# Patient Record
Sex: MALE | Race: WHITE | NOT HISPANIC OR LATINO | Employment: OTHER | URBAN - METROPOLITAN AREA
[De-identification: names, ages, dates, MRNs, and addresses within clinical notes are randomized per-mention and may not be internally consistent; named-entity substitution may affect disease eponyms.]

---

## 2017-01-10 DIAGNOSIS — I10 ESSENTIAL HYPERTENSION: ICD-10-CM

## 2017-01-10 RX ORDER — AMLODIPINE BESYLATE 5 MG/1
5 TABLET ORAL
Qty: 90 TAB | Refills: 1 | Status: SHIPPED | OUTPATIENT
Start: 2017-01-10 | End: 2017-07-07 | Stop reason: SDUPTHER

## 2017-01-18 ENCOUNTER — OFFICE VISIT (OUTPATIENT)
Dept: MEDICAL GROUP | Facility: MEDICAL CENTER | Age: 82
End: 2017-01-18
Payer: COMMERCIAL

## 2017-01-18 ENCOUNTER — HOSPITAL ENCOUNTER (OUTPATIENT)
Dept: RADIOLOGY | Facility: MEDICAL CENTER | Age: 82
End: 2017-01-18
Attending: FAMILY MEDICINE
Payer: COMMERCIAL

## 2017-01-18 VITALS
RESPIRATION RATE: 16 BRPM | OXYGEN SATURATION: 94 % | WEIGHT: 214 LBS | HEIGHT: 69 IN | DIASTOLIC BLOOD PRESSURE: 78 MMHG | HEART RATE: 66 BPM | SYSTOLIC BLOOD PRESSURE: 128 MMHG | BODY MASS INDEX: 31.7 KG/M2 | TEMPERATURE: 97.9 F

## 2017-01-18 DIAGNOSIS — N18.30 CKD (CHRONIC KIDNEY DISEASE) STAGE 3, GFR 30-59 ML/MIN (HCC): ICD-10-CM

## 2017-01-18 DIAGNOSIS — Z00.00 HEALTH CARE MAINTENANCE: ICD-10-CM

## 2017-01-18 DIAGNOSIS — E11.8 CONTROLLED TYPE 2 DIABETES MELLITUS WITH COMPLICATION, WITH LONG-TERM CURRENT USE OF INSULIN (HCC): ICD-10-CM

## 2017-01-18 DIAGNOSIS — M79.672 LEFT FOOT PAIN: ICD-10-CM

## 2017-01-18 DIAGNOSIS — C91.10 CHRONIC LYMPHOCYTIC LEUKEMIA (HCC): ICD-10-CM

## 2017-01-18 DIAGNOSIS — E66.9 OBESITY (BMI 30-39.9): ICD-10-CM

## 2017-01-18 DIAGNOSIS — I48.0 PAROXYSMAL ATRIAL FIBRILLATION (HCC): ICD-10-CM

## 2017-01-18 DIAGNOSIS — Z79.4 CONTROLLED TYPE 2 DIABETES MELLITUS WITH COMPLICATION, WITH LONG-TERM CURRENT USE OF INSULIN (HCC): ICD-10-CM

## 2017-01-18 DIAGNOSIS — I10 ESSENTIAL HYPERTENSION: ICD-10-CM

## 2017-01-18 DIAGNOSIS — Z86.718 HISTORY OF DVT (DEEP VEIN THROMBOSIS): ICD-10-CM

## 2017-01-18 DIAGNOSIS — C61 PROSTATE CANCER (HCC): ICD-10-CM

## 2017-01-18 DIAGNOSIS — Z95.0 CARDIAC PACEMAKER IN SITU: ICD-10-CM

## 2017-01-18 DIAGNOSIS — E79.0 HYPERURICEMIA: ICD-10-CM

## 2017-01-18 PROCEDURE — 73630 X-RAY EXAM OF FOOT: CPT | Mod: LT

## 2017-01-18 PROCEDURE — 99204 OFFICE O/P NEW MOD 45 MIN: CPT | Performed by: FAMILY MEDICINE

## 2017-01-18 NOTE — MR AVS SNAPSHOT
"        Werner Acuña   2017 8:20 AM   Office Visit   MRN: [id number]    Department:  [address] [place] [address] Group   Dept Phone:  [phone]    Description:  Male : 1933   Provider:  Kip Dyson M.D.           Reason for Visit     New Patient establish      Allergies as of 2017     Allergen Noted Reactions    Nkda [No Known Drug Allergy] 02/15/2008         You were diagnosed with     Obesity (BMI 30-39.9)   [871005]       Controlled type 2 diabetes mellitus with complication, with long-term current use of insulin (CMS-HCC)   [7393503]       Essential hypertension   [7740961]       Cardiac pacemaker in situ   [V45.01.ICD-9-CM]       Chronic lymphocytic leukemia (CMS-HCC)   [347587]       Prostate cancer (CMS-HCC)   [910363]       Hyperuricemia   [542760]       History of DVT (deep vein thrombosis)   [927768]       Paroxysmal atrial fibrillation (CMS-HCC)   [216806]       Health care maintenance   [571622]       CKD (chronic kidney disease) stage 3, GFR 30-59 ml/min   [335437]       Left foot pain   [263756]         Vital Signs     Blood Pressure Pulse Temperature Respirations Height Weight    128/78 mmHg 66 36.6 °C (97.9 °F) 16 1.753 m (5' 9.02\") 97.07 kg (214 lb)    Body Mass Index Oxygen Saturation Smoking Status             31.59 kg/m2 94% Never Smoker          Basic Information     Date Of Birth Sex Race Ethnicity Preferred Language    1933 Male White Non- English      Your appointments     Feb 15, 2017  8:15 AM   Established Patient with V EXAM 5   [facility]  (--)    [address]  [place] [address]   [phone]            2017  3:00 PM   Established Patient with Kip Dyson M.D.   [facility] [address]    [address]  [address]  [place] [address] [phone]           You will be receiving a confirmation call a few days before your appointment from our " automated call confirmation system.              Problem List              ICD-10-CM Priority Class Noted - Resolved    Cardiac pacemaker in situ Z95.0   9/1/2011 - Present    Chronic lymphocytic leukemia (CMS-HCC) C91.10   9/1/2011 - Present    Essential hypertension I10   9/1/2011 - Present    CKD (chronic kidney disease) stage 3, GFR 30-59 ml/min N18.3   9/1/2011 - Present    Diabetes mellitus type 2 in nonobese (CMS-HCC) E11.9   9/1/2011 - Present    SSS (sick sinus syndrome) (CMS-HCC) I49.5   9/19/2012 - Present    History of DVT (deep vein thrombosis) Z86.718   8/25/2014 - Present    H/O vitamin D deficiency Z86.39   8/25/2014 - Present    Hyperuricemia E79.0   8/25/2014 - Present    Prostate cancer (CMS-HCC) C61   8/25/2014 - Present    Paroxysmal atrial fibrillation (CMS-HCC) I48.0   5/7/2015 - Present    Dyslipidemia E78.5   5/30/2016 - Present    Obesity (BMI 30-39.9) E66.9   1/18/2017 - Present      Health Maintenance        Date Due Completion Dates    URINE ACR / MICROALBUMIN 9/22/1951 ---    IMM ZOSTER VACCINE 9/22/1993 ---    IMM DTaP/Tdap/Td Vaccine (1 - Tdap) 10/27/2011 10/26/2011    RETINAL SCREENING 2/15/2015 2/15/2014 (Done)    Override on 2/15/2014: Done    DIABETES MONOFILAMENT / LE EXAM 8/6/2015 8/6/2014 (Done)    Override on 8/6/2014: Done    A1C SCREENING 8/26/2015 2/26/2015, 10/26/2011, 2/4/2007    IMM PNEUMOCOCCAL 65+ (ADULT) HIGH/HIGHEST RISK SERIES (2 of 2 - PPSV23) 1/27/2016 12/2/2015    FASTING LIPID PROFILE 11/25/2017 11/25/2016, 2/26/2015, 3/6/2012, 10/27/2011    SERUM CREATININE 12/1/2017 12/1/2016, 2/26/2015, 3/6/2012, 10/28/2011, 10/27/2011, 10/26/2011, 1/21/2011, 2/16/2008, 2/15/2008, 2/8/2007, 2/7/2007, 2/6/2007, 2/6/2007, 2/6/2007, 2/6/2007, 2/5/2007, 2/5/2007, 2/5/2007, 2/4/2007, 2/4/2007, 2/4/2007            Current Immunizations     13-VALENT PCV PREVNAR 12/2/2015    Influenza Vaccine Adult HD 9/15/2016, 9/15/2015, 9/23/2013    Influenza Vaccine Quad Inj (Preserved)  9/27/2011    Pneumococcal Vaccine (UF)Historical Data 11/27/2009    TD Vaccine 10/26/2011  3:44 PM      Below and/or attached are the medications your provider expects you to take. Review all of your home medications and newly ordered medications with your provider and/or pharmacist. Follow medication instructions as directed by your provider and/or pharmacist. Please keep your medication list with you and share with your provider. Update the information when medications are discontinued, doses are changed, or new medications (including over-the-counter products) are added; and carry medication information at all times in the event of emergency situations     Allergies:  NKDA - (reactions not documented)               Medications  Valid as of: January 18, 2017 -  8:44 AM    Generic Name Brand Name Tablet Size Instructions for use    Allopurinol (Tab) ZYLOPRIM 100 MG TAKE 1 TAB BY MOUTH EVERY DAY.        AmLODIPine Besylate (Tab) NORVASC 5 MG Take 1 Tab by mouth every day.        Azithromycin (Tab) ZITHROMAX 250 MG 2 tablets Day 1, then 1 tablet a day        Carvedilol Phosphate (CAPSULE SR 24 HR) COREG CR 20 MG TAKE 1 CAPSULE ORALLY EVERY DAY        Ferrous Sulfate (Tab) Iron 325 (65 FE) MG Take 1 Tab by mouth every day.        Furosemide (Tab) LASIX 40 MG Take 1 Tab by mouth every day.        Glucose Blood (Strip) glucose blood  1 Strip by Other route BID 2 DAYS A WEEK.        Insulin Detemir (Solution Pen-injector) LEVEMIR FLEXPEN 100 UNIT/ML INJECT 25 UNITS AS DIRECTED AT BEDTIME        Nateglinide (Tab) STARLIX 120 MG Take 1 Tab by mouth 2 Times a Day.        Niacin (Tab) niacin 500 MG Take 500 mg by mouth every day.        Pioglitazone HCl (Tab) ACTOS 30 MG Take 1 Tab by mouth every day. TAKE 1 TAB BY MOUTH EVERY DAY.        Pseudoephedrine HCl   Take  by mouth as needed.          Rosuvastatin Calcium (Tab) CRESTOR 10 MG Take 1 Tab by mouth every day.        Warfarin Sodium (Tab) COUMADIN 3 MG Take 0.5 Tabs by  mouth every day. Followed by Dr. Palomino        .                 Medicines prescribed today were sent to:     [id number] - [place], [place] - [address]    [address] [place] [address]    Phone: [phone] Fax: [phone]    Open 24 Hours?: No      Medication refill instructions:       If your prescription bottle indicates you have medication refills left, it is not necessary to call your provider’s office. Please contact your pharmacy and they will refill your medication.    If your prescription bottle indicates you do not have any refills left, you may request refills at any time through one of the following ways: The online [employer] system (except Urgent Care), by calling your provider’s office, or by asking your pharmacy to contact your provider’s office with a refill request. Medication refills are processed only during regular business hours and may not be available until the next business day. Your provider may request additional information or to have a follow-up visit with you prior to refilling your medication.   *Please Note: Medication refills are assigned a new Rx number when refilled electronically. Your pharmacy may indicate that no refills were authorized even though a new prescription for the same medication is available at the pharmacy. Please request the medicine by name with the pharmacy before contacting your provider for a refill.        Your To Do List     Future Labs/Procedures Complete By Expires    DX-FOOT-2- LEFT  As directed 1/18/2018    HEMOGLOBIN A1C (For A1C Every 6 Months Topic)  As directed 1/18/2018    Comments:    HEMOGLOBIN A1C   [unfilled]    MICROALBUMIN CREAT RATIO URINE  As directed 1/18/2018      Referral     A referral request has been sent to our patient care coordination department. Please allow 3-5 business days for us to process this request and contact you either by phone or mail. If you do not hear from us by the 5th business day, please call us at  [phone].           Sharethrough Access Code: [email or web address]  Expires: 2/2/2017  1:57 PM    Sharethrough  A secure, online tool to manage your health information     [employer]’s Sharethrough® is a secure, online tool that connects you to your personalized health information from the privacy of your home -- day or night - making it very easy for you to manage your healthcare. Once the activation process is completed, you can even access your medical information using the Sharethrough jase, which is available for free in the Apple Jase store or Google Play store.     Sharethrough provides the following levels of access (as shown below):   My Chart Features   [facility] Primary Care Doctor [facility]  Specialists [facility]  Urgent  Care Non-[facility]  Primary Care  Doctor   Email your healthcare team securely and privately 24/7 X X X    Manage appointments: schedule your next appointment; view details of past/upcoming appointments X      Request prescription refills. X      View recent personal medical records, including lab and immunizations X X X X   View health record, including health history, allergies, medications X X X X   Read reports about your outpatient visits, procedures, consult and ER notes X X X X   See your discharge summary, which is a recap of your hospital and/or ER visit that includes your diagnosis, lab results, and care plan. X X       How to register for Sharethrough:  1. Go to  [email or web address].  2. Click on the Sign Up Now box, which takes you to the New Member Sign Up page. You will need to provide the following information:  a. Enter your Sharethrough Access Code exactly as it appears at the top of this page. (You will not need to use this code after you’ve completed the sign-up process. If you do not sign up before the expiration date, you must request a new code.)   b. Enter your date of birth.   c. Enter your home email address.   d. Click Submit, and follow the next screen’s instructions.  3. Create a Sharethrough ID.  This will be your [employer] login ID and cannot be changed, so think of one that is secure and easy to remember.  4. Create a [employer]iklu password. You can change your password at any time.  5. Enter your Password Reset Question and Answer. This can be used at a later time if you forget your password.   6. Enter your e-mail address. This allows you to receive e-mail notifications when new information is available in Siklu.  7. Click Sign Up. You can now view your health information.    For assistance activating your [employer] account, call [phone]

## 2017-01-18 NOTE — PROGRESS NOTES
CC: Establish care     HPI:  Werner presents today to establish a new PCP. Was Dr Lucero's patient.    Active, and independent with all ADLs. 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 Has the following medical issues:    Type 2 diabetes mellitus, has been adequately controlled. Last A1C was 5.9.Denies excessive urination, and drinking of water, no symptoms of low blood sugar ( dizziness, and sweating). Currently on Livemere insulin 22 units daily, and Actos 30 mg daily, and Starlix 120 mg bid.No side effects. Due for mnofilament foot exam, had eye exam recently in 12/2016, advised to bring all records .    Essential hypertension, BP has been adequately controlled on current medication. Denies headache, chest pain, and SOB.Currently on Amlodipine 5 mg , Carvedilol 25 mg daily.No side effects.    Has a cardiac pacemaker in situ, for SSS. Has been stable, and asymptomatic.He follows up with cardiology Dr Prajapati  for pacemaker check up    Chronic lymphocytic leukemia , has been in an early stage, he is stable, and asymptomatic.    Prostate cancer , had biopsy a year go, was found positive for cancer, no treatment was given. He follows up with urology Dr Taylor every 3-6 month. However he has been asymptomatic.    Hyperuricemia, has high uric acid, but has never has symptoms of gout.Currently on Allopurinol 100 mg daily.    Has h/o recurrent bilateral leg clots.has been on chronic anticoagulation with warfarin. Has been asymptomatic since then.    Paroxysmal atrial fibrillation, has been asymptomatic.Has normal rate and rhythm.Currently on Carvedilol and warfarin.    Chronic kidney disease) stage 3,his last eGFR was 34, Cr 1.9.has been asymptomatic.He follows up with nephrology ( Dr Harrington) as needed.    Left foot pain, patient noticed a red ness of the left 2nd toe with mild pain, he stated that he should have hit it against something butr he does not remember, has not h/o peripheral neuropathy has been having normal  sensation on both feet.    Obesity, BMI is 31.59.The medical rationale for weight loss in obese individuals is that obesity is associated with a significant increase in mortality, and many health risks including type 2 diabetes mellitus, hypertension, dyslipidemia, and coronary heart disease. The benefits of weight loss include a reduction in the rate of progression from impaired glucose tolerance to diabetes, blood pressure in hypertensive patients, and lipid levels in higher risk patients. Other noncardiac benefits of weight loss include reductions in urinary incontinence, sleep apnea, and depression, and improvements in quality of life, physical functioning, and mobility.Recommend lifestyle modification: exercise 30 minutes per day 5 days per week. Recommend also portion control.    Pneumonia vaccine, and flu shot are UTD.    Patient Active Problem List    Diagnosis Date Noted   • Obesity (BMI 30-39.9) 01/18/2017   • Dyslipidemia 05/30/2016   • Paroxysmal atrial fibrillation (CMS-HCC) 05/07/2015   • History of DVT (deep vein thrombosis) 08/25/2014   • H/O vitamin D deficiency 08/25/2014   • Hyperuricemia 08/25/2014   • Prostate cancer (CMS-HCC) 08/25/2014   • SSS (sick sinus syndrome) (CMS-HCC) 09/19/2012   • Cardiac pacemaker in situ 09/01/2011   • Chronic lymphocytic leukemia (CMS-HCC) 09/01/2011   • Essential hypertension 09/01/2011   • CKD (chronic kidney disease) stage 3, GFR 30-59 ml/min 09/01/2011   • Diabetes mellitus type 2 in nonobese (CMS-HCC) 09/01/2011       Current Outpatient Prescriptions   Medication Sig Dispense Refill   • amlodipine (NORVASC) 5 MG Tab Take 1 Tab by mouth every day. 90 Tab 1   • glucose blood (ONE TOUCH ULTRA TEST) strip 1 Strip by Other route BID 2 DAYS A WEEK. 100 Strip 3   • furosemide (LASIX) 40 MG Tab Take 1 Tab by mouth every day. 30 Tab 3   • pioglitazone (ACTOS) 30 MG Tab Take 1 Tab by mouth every day. TAKE 1 TAB BY MOUTH EVERY DAY. 90 Tab 2   • nateglinide (STARLIX) 120  MG Tab Take 1 Tab by mouth 2 Times a Day. 60 Tab 5   • rosuvastatin (CRESTOR) 10 MG Tab Take 1 Tab by mouth every day. 90 Tab 3   • LEVEMIR FLEXPEN 100 UNIT/ML Solution Pen-injector injection INJECT 25 UNITS AS DIRECTED AT BEDTIME 45 PEN 1   • COREG CR 20 MG CAPSULE SR 24 HR TAKE 1 CAPSULE ORALLY EVERY DAY 90 Cap 2   • allopurinol (ZYLOPRIM) 100 MG Tab TAKE 1 TAB BY MOUTH EVERY DAY. 90 Tab 3   • Pseudoephedrine HCl (SUDAFED 12 HOUR PO) Take  by mouth as needed.       • warfarin (COUMADIN) 3 MG TABS Take 0.5 Tabs by mouth every day. Followed by Dr. Palomino 1 Each 0   • Ferrous Sulfate (IRON) 325 (65 FE) MG TABS Take 1 Tab by mouth every day.     • azithromycin (ZITHROMAX) 250 MG Tab 2 tablets Day 1, then 1 tablet a day 6 Tab 0   • niacin 500 MG TABS Take 500 mg by mouth every day.       No current facility-administered medications for this visit.         Allergies as of 01/18/2017 - Fernie as Reviewed 01/18/2017   Allergen Reaction Noted   • Nkda [no known drug allergy]  02/15/2008        Social History     Social History   • Marital Status:      Spouse Name: N/A   • Number of Children: N/A   • Years of Education: N/A     Occupational History   • Not on file.     Social History Main Topics   • Smoking status: Never Smoker    • Smokeless tobacco: Never Used   • Alcohol Use: No   • Drug Use: No   • Sexual Activity:     Partners: Female     Other Topics Concern   • Not on file     Social History Narrative       Family History   Problem Relation Age of Onset   • Other Mother    • Lung Disease Father        Past Surgical History   Procedure Laterality Date   • Pacemaker insertion       2008 [place]       ROS:  Denies any Headache, Blurred Vision, Confusion Chest pain,  Shortness of breath,  Abdominal pain, Changes of bowel or bladder, Lower ext edema, Fevers, Nights sweats, Weight Changes, Focal weakness or numbness.  All other systems are negative.    /78 mmHg  Pulse 66  Temp(Src) 36.6 °C (97.9 °F)  Resp 16   "Ht 1.753 m (5' 9.02\")  Wt 97.07 kg (214 lb)  BMI 31.59 kg/m2  SpO2 94%    Physical Exam:  Gen:         Alert and oriented, No apparent distress.  HEENT:   Perrla, TM clear,  Oralpharynx no erythema or exudates.  Neck:       No Jugular venous distension, Lymphadenopathy, Thyromegaly, Bruits.  Lungs:     Clear to auscultation bilaterally  CV:          Regular rate and rhythm. No murmurs, rubs or gallops.  Abd:         Soft non tender, non distended. Normal active bowel sounds. No hepatosplenomegaly, No pulsatile masses.  Ext:          No clubbing, cyanosis, edema.      Monofilament testing with a 10 gram force: sensation intact: all location, 6/6  Visual Inspection: Feet w/o maceration, ulcers, fissures.Mild redness but no tenderness of the left 2nd toe  Pedal pulses: intact    Assessment and Plan.   83 y.o. male     1. Obesity (BMI 30-39.9)  BMI is 31.59.  Patient is counseled about life style modification ( low carb, and fat diet, age appropriate exercise)  Complications of obesity were discussed.    - Patient identified as having weight management issue.  Appropriate orders and counseling given.    2. Controlled type 2 diabetes mellitus with complication, with long-term current use of insulin (CMS-[facility])  Adequately controlled. Last A1C was 5.9.  Continue on Livemere insulin 22 units daily, and Actos 30 mg daily, and Starlix 120 mg bid.  Monofilament foot exam showed no sign of neuropathy.    - HEMOGLOBIN A1C (For A1C Every 6 Months Topic); Future  - REFERRAL FOR RETINAL SCREENING EXAM  - Diabetic Monofilament Lower Extremity Exam  - MICROALBUMIN CREAT RATIO URINE; Future    3. Essential hypertension  Has been adequately controlled on current medication. Denies headache, chest pain, and SOB.  Continue on Amlodipine 5 mg , Carvedilol 25 mg daily.    4. Cardiac pacemaker in situ  Stable, asymptomatic.  Continue follow up with cardiology Dr Prajapati  for pacemaker check up    5. Chronic lymphocytic leukemia " (CMS-HCC)  Stable. Asymptomatic.    6. Prostate cancer (CMS-HCC)  Stable, asymptomatic.  Continue follow up with uroilogy    7. Hyperuricemia  High uric acid, but has never has symptoms of gout.  Continue on Allopurinol 100 mg daily.    8. History of DVT (deep vein thrombosis)  H/O recurrent bilateral leg clots.  Continue chronic anticoagulation with warfarin    9. Paroxysmal atrial fibrillation (CMS-HCC)  Stable.asymptomatic.  Currently has normal rate and rhythm.  Continue on BB, and warfarin.    10. Health care maintenance  Pneumonia vaccine, and flu shot are UTD>    11. CKD (chronic kidney disease) stage 3, GFR 30-59 ml/min  Last eGFR was 34, Cr 1.9.  Stable asymptomatic.  Continue follow up with nephrology ( Dr Harrington) as needed.    12. Left foot pain  No tenderness, probably minor trauma, however will do x ray to r/o fracture.    - DX-FOOT-2- LEFT; Future

## 2017-01-20 LAB
ALBUMIN/CREAT UR: 18.5 MG/G CREAT (ref 0–30)
CREAT UR-MCNC: 41.7 MG/DL
HBA1C MFR BLD: 6.1 % (ref 4.8–5.6)
MICROALBUMIN UR-MCNC: 7.7 UG/ML

## 2017-02-15 ENCOUNTER — ANTICOAGULATION VISIT (OUTPATIENT)
Dept: VASCULAR LAB | Facility: MEDICAL CENTER | Age: 82
End: 2017-02-15
Attending: INTERNAL MEDICINE
Payer: COMMERCIAL

## 2017-02-15 DIAGNOSIS — Z86.718 HISTORY OF DVT (DEEP VEIN THROMBOSIS): ICD-10-CM

## 2017-02-15 DIAGNOSIS — I48.0 PAROXYSMAL ATRIAL FIBRILLATION (HCC): ICD-10-CM

## 2017-02-15 LAB — INR PPP: 1.6 (ref 2–3.5)

## 2017-02-15 PROCEDURE — 85610 PROTHROMBIN TIME: CPT

## 2017-02-15 PROCEDURE — 99212 OFFICE O/P EST SF 10 MIN: CPT

## 2017-02-15 NOTE — PROGRESS NOTES
Anticoagulation Summary as of 2/15/2017     INR goal 2.0-3.0   Selected INR 1.6! (2/15/2017)   Maintenance plan 4.5 mg (3 mg x 1.5) on Mon; 3 mg (3 mg x 1) all other days   Weekly total 22.5 mg   Plan last modified ERROL JettD (10/1/2015)   Next INR check 4/12/2017   Target end date     Indications   Paroxysmal atrial fibrillation (CMS-HCC) [I48.0]  History of DVT (deep vein thrombosis) [Z86.718]         Anticoagulation Episode Summary     [facility] check location [facility]    Preferred lab [facility]    Send INR reminders to     Comments Fax progress note to Dr. Prajapati [phone]      Anticoagulation Care Providers     Provider Role Specialty Phone number    Tito Prajapati M.D. Referring Cardiology [phone]    [facility] Responsible  [phone]        Anticoagulation Patient Findings    Patient's INR was SUB therapeutic today in clinic.     Pt denies any unusual s/s of bleeding, bruising, clotting or any changes to medications.   States he started drinking V8 juice, discussed the interaction, pt has decided to discontinue V8.  Confirmed dosing regimen.  Bolus dose today then Pt is to continue with current warfarin dosing regimen.     He prefers to follow up in 8 weeks with his significant other/friend.    Miguel Jurado, PHARMD

## 2017-02-15 NOTE — MR AVS SNAPSHOT
Werner Acuña   2/15/2017 8:15 AM   Anticoagulation Visit   MRN: [id number]    Department:  Vascular Medicine   Dept Phone:  [phone]    Description:  Male : 1933   Provider:  [facility] EXAM 5           Allergies as of 2/15/2017     Allergen Noted Reactions    Nkda [No Known Drug Allergy] 02/15/2008         You were diagnosed with     Paroxysmal atrial fibrillation (CMS-HCC)   [184411]       History of DVT (deep vein thrombosis)   [703799]         Vital Signs     Smoking Status                   Never Smoker            Basic Information     Date Of Birth Sex Race Ethnicity Preferred Language    1933 Male White Non- English      Your appointments     Feb 15, 2017  8:15 AM   Established Patient with IHV EXAM 5   [facility]  (--)    [address] [facility] [address]   [phone]            2017  8:15 AM   Established Patient with IHV EXAM 5   [facility]  (--)    [address] [facility] [place] [address]   [phone]            2017  3:00 PM   Established Patient with Kip Dyson M.D.   [facility] [address]    [address]  [address]  [facility] [id number] [phone]           You will be receiving a confirmation call a few days before your appointment from our automated call confirmation system.              Problem List              ICD-10-CM Priority Class Noted - Resolved    Cardiac pacemaker in situ Z95.0   2011 - Present    Chronic lymphocytic leukemia (CMS-HCC) C91.10   2011 - Present    Essential hypertension I10   2011 - Present    CKD (chronic kidney disease) stage 3, GFR 30-59 ml/min N18.3   2011 - Present    Diabetes mellitus type 2 in nonobese (CMS-HCC) E11.9   2011 - Present    SSS (sick sinus syndrome) (CMS-HCC) I49.5   2012 - Present    History of DVT (deep vein thrombosis)  Z86.718   8/25/2014 - Present    H/O vitamin D deficiency Z86.39   8/25/2014 - Present    Hyperuricemia E79.0   8/25/2014 - Present    Prostate cancer (CMS-HCC) C61   8/25/2014 - Present    Paroxysmal atrial fibrillation (CMS-HCC) I48.0   5/7/2015 - Present    Dyslipidemia E78.5   5/30/2016 - Present    Obesity (BMI 30-39.9) E66.9   1/18/2017 - Present      Health Maintenance        Date Due Completion Dates    IMM ZOSTER VACCINE 9/22/1993 ---    IMM DTaP/Tdap/Td Vaccine (1 - Tdap) 10/27/2011 10/26/2011    RETINAL SCREENING 2/15/2015 2/15/2014 (Done)    Override on 2/15/2014: Done    IMM PNEUMOCOCCAL 65+ (ADULT) HIGH/HIGHEST RISK SERIES (2 of 2 - PPSV23) 1/27/2016 12/2/2015    A1C SCREENING 7/19/2017 1/19/2017, 2/26/2015, 10/26/2011, 2/4/2007    FASTING LIPID PROFILE 11/25/2017 11/25/2016, 2/26/2015, 3/6/2012, 10/27/2011    SERUM CREATININE 12/1/2017 12/1/2016, 2/26/2015, 3/6/2012, 10/28/2011, 10/27/2011, 10/26/2011, 1/21/2011, 2/16/2008, 2/15/2008, 2/8/2007, 2/7/2007, 2/6/2007, 2/6/2007, 2/6/2007, 2/6/2007, 2/5/2007, 2/5/2007, 2/5/2007, 2/4/2007, 2/4/2007, 2/4/2007    DIABETES MONOFILAMENT / LE EXAM 1/18/2018 1/18/2017, 8/6/2014 (Done)    Override on 8/6/2014: Done    URINE ACR / MICROALBUMIN 1/19/2018 1/19/2017            Results     POCT Protime      Component    INR    1.6                        Current Immunizations     13-VALENT PCV PREVNAR 12/2/2015    Influenza Vaccine Adult HD 9/15/2016, 9/15/2015, 9/23/2013    Influenza Vaccine Quad Inj (Preserved) 9/27/2011    Pneumococcal Vaccine (UF)Historical Data 11/27/2009    TD Vaccine 10/26/2011  3:44 PM      Below and/or attached are the medications your provider expects you to take. Review all of your home medications and newly ordered medications with your provider and/or pharmacist. Follow medication instructions as directed by your provider and/or pharmacist. Please keep your medication list with you and share with your provider. Update the information when  medications are discontinued, doses are changed, or new medications (including over-the-counter products) are added; and carry medication information at all times in the event of emergency situations     Allergies:  NKDA - (reactions not documented)               Medications  Valid as of: February 15, 2017 -  7:54 AM    Generic Name Brand Name Tablet Size Instructions for use    Allopurinol (Tab) ZYLOPRIM 100 MG TAKE 1 TAB BY MOUTH EVERY DAY.        AmLODIPine Besylate (Tab) NORVASC 5 MG Take 1 Tab by mouth every day.        Azithromycin (Tab) ZITHROMAX 250 MG 2 tablets Day 1, then 1 tablet a day        Carvedilol Phosphate (CAPSULE SR 24 HR) COREG CR 20 MG TAKE 1 CAPSULE ORALLY EVERY DAY        Ferrous Sulfate (Tab) Iron 325 (65 FE) MG Take 1 Tab by mouth every day.        Furosemide (Tab) LASIX 40 MG Take 1 Tab by mouth every day.        Glucose Blood (Strip) glucose blood  1 Strip by Other route BID 2 DAYS A WEEK.        Insulin Detemir (Solution Pen-injector) LEVEMIR FLEXPEN 100 UNIT/ML INJECT 25 UNITS AS DIRECTED AT BEDTIME        Nateglinide (Tab) STARLIX 120 MG Take 1 Tab by mouth 2 Times a Day.        Niacin (Tab) niacin 500 MG Take 500 mg by mouth every day.        Pioglitazone HCl (Tab) ACTOS 30 MG Take 1 Tab by mouth every day. TAKE 1 TAB BY MOUTH EVERY DAY.        Pseudoephedrine HCl   Take  by mouth as needed.          Rosuvastatin Calcium (Tab) CRESTOR 10 MG Take 1 Tab by mouth every day.        Warfarin Sodium (Tab) COUMADIN 3 MG Take 0.5 Tabs by mouth every day. Followed by Dr. Palomino        .                 Medicines prescribed today were sent to:     [facility]/PHARMACY #[id number] - [place] - [address] [facility]    [address] [place] [address] [place] [address]    Phone: [phone] Fax: [phone]    Open 24 Hours?: No      Medication refill instructions:       If your prescription bottle indicates you have medication refills left, it is not necessary to call your provider’s office. Please contact your pharmacy and  they will refill your medication.    If your prescription bottle indicates you do not have any refills left, you may request refills at any time through one of the following ways: The online SmartRecruiters system (except Urgent Care), by calling your provider’s office, or by asking your pharmacy to contact your provider’s office with a refill request. Medication refills are processed only during regular business hours and may not be available until the next business day. Your provider may request additional information or to have a follow-up visit with you prior to refilling your medication.   *Please Note: Medication refills are assigned a new Rx number when refilled electronically. Your pharmacy may indicate that no refills were authorized even though a new prescription for the same medication is available at the pharmacy. Please request the medicine by name with the pharmacy before contacting your provider for a refill.        Warfarin Dosing Calendar February 2017 Details    Sun Mon Tue Wed Thu Fri Sat        1               2               3               4                 5               6               7               8               9               10               11                 12               13               14               15   1.6   4.5 mg   See details      16      3 mg         17      3 mg         18      3 mg           19      3 mg         20      4.5 mg         21      3 mg         22      3 mg         23      3 mg         24      3 mg         25      3 mg           26      3 mg         27      4.5 mg         28      3 mg              Date Details   02/15 This INR check   INR: 1.6               How to take your warfarin dose     To take:  3 mg Take 1 of the 3 mg tablets.    To take:  4.5 mg Take 1.5 of the 3 mg tablets.           Warfarin Dosing Calendar March 2017 Details    Sun Mon Tue Wed Thu Fri Sat        1      3 mg         2      3 mg         3      3 mg         4      3 mg           5       3 mg         6      4.5 mg         7      3 mg         8      3 mg         9      3 mg         10      3 mg         11      3 mg           12      3 mg         13      4.5 mg         14      3 mg         15      3 mg         16      3 mg         17      3 mg         18      3 mg           19      3 mg         20      4.5 mg         21      3 mg         22      3 mg         23      3 mg         24      3 mg         25      3 mg           26      3 mg         27      4.5 mg         28      3 mg         29      3 mg         30      3 mg         31      3 mg           Date Details   No additional details            How to take your warfarin dose     To take:  3 mg Take 1 of the 3 mg tablets.    To take:  4.5 mg Take 1.5 of the 3 mg tablets.           Warfarin Dosing Calendar   April 2017 Details    Sun Mon Tue Wed Thu Fri Sat           1      3 mg           2      3 mg         3      4.5 mg         4      3 mg         5      3 mg         6      3 mg         7      3 mg         8      3 mg           9      3 mg         10      4.5 mg         11      3 mg         12      3 mg         13               14               15                 16               17               18               19               20               21               22                 23               24               25               26               27               28               29                 30                      Date Details   No additional details    Date of next INR:  4/12/2017         How to take your warfarin dose     To take:  3 mg Take 1 of the 3 mg tablets.    To take:  4.5 mg Take 1.5 of the 3 mg tablets.              sailsquare Access Code: [email or web address]  Expires: 3/3/2017 12:25 PM    sailsquare  A secure, online tool to manage your health information     Stratio® is a secure, online tool that connects you to your personalized health information from the privacy of your home -- day or night - making it very easy  for you to manage your healthcare. Once the activation process is completed, you can even access your medical information using the [employer] jase, which is available for free in the Apple Jase store or Google Play store.     [employer] provides the following levels of access (as shown below):   My Chart Features   [facility] Primary [facility] Doctor [facility]  Specialists [facility]  Urgent  Care Non-[facility]  Primary Care  Doctor   Email your healthcare team securely and privately 24/7 X X X    Manage appointments: schedule your next appointment; view details of past/upcoming appointments X      Request prescription refills. X      View recent personal medical records, including lab and immunizations X X X X   View health record, including health history, allergies, medications X X X X   Read reports about your outpatient visits, procedures, consult and ER notes X X X X   See your discharge summary, which is a recap of your hospital and/or ER visit that includes your diagnosis, lab results, and care plan. X X       How to register for [employer]:  1. Go to  [email or web address].  2. Click on the Sign Up Now box, which takes you to the New Member Sign Up page. You will need to provide the following information:  a. Enter your [employer] Access Code exactly as it appears at the top of this page. (You will not need to use this code after you’ve completed the sign-up process. If you do not sign up before the expiration date, you must request a new code.)   b. Enter your date of birth.   c. Enter your home email address.   d. Click Submit, and follow the next screen’s instructions.  3. Create a [employer] ID. This will be your [employer] login ID and cannot be changed, so think of one that is secure and easy to remember.  4. Create a [employer] password. You can change your password at any time.  5. Enter your Password Reset Question and Answer. This can be used at a later time if you forget your password.   6. Enter your e-mail address. This allows  you to receive e-mail notifications when new information is available in Wordinaire.  7. Click Sign Up. You can now view your health information.    For assistance activating your Word[employer] account, call [phone]

## 2017-02-16 LAB — INR BLD: 1.6 (ref 0.9–1.2)

## 2017-03-20 DIAGNOSIS — E11.9 DIABETES MELLITUS TYPE 2 IN NONOBESE (HCC): ICD-10-CM

## 2017-03-20 RX ORDER — NATEGLINIDE 120 MG/1
120 TABLET ORAL 2 TIMES DAILY
Qty: 60 TAB | Refills: 0 | Status: SHIPPED | OUTPATIENT
Start: 2017-03-20 | End: 2017-04-28 | Stop reason: SDUPTHER

## 2017-03-21 DIAGNOSIS — I10 ESSENTIAL HYPERTENSION: ICD-10-CM

## 2017-03-21 RX ORDER — FUROSEMIDE 40 MG/1
40 TABLET ORAL
Qty: 30 TAB | Refills: 0 | Status: SHIPPED | OUTPATIENT
Start: 2017-03-21 | End: 2017-04-19 | Stop reason: SDUPTHER

## 2017-03-22 DIAGNOSIS — I10 ESSENTIAL HYPERTENSION: ICD-10-CM

## 2017-03-22 RX ORDER — CARVEDILOL PHOSPHATE 20 MG/1
20 CAPSULE, EXTENDED RELEASE ORAL
Qty: 90 CAP | Refills: 2 | Status: SHIPPED | OUTPATIENT
Start: 2017-03-22 | End: 2017-12-21 | Stop reason: SDUPTHER

## 2017-04-12 ENCOUNTER — ANTICOAGULATION VISIT (OUTPATIENT)
Dept: VASCULAR LAB | Facility: MEDICAL CENTER | Age: 82
End: 2017-04-12
Attending: INTERNAL MEDICINE
Payer: COMMERCIAL

## 2017-04-12 DIAGNOSIS — Z86.718 HISTORY OF DVT (DEEP VEIN THROMBOSIS): ICD-10-CM

## 2017-04-12 DIAGNOSIS — I48.0 PAROXYSMAL ATRIAL FIBRILLATION (HCC): ICD-10-CM

## 2017-04-12 LAB
INR BLD: 2.4 (ref 0.9–1.2)
INR PPP: 2.4 (ref 2–3.5)

## 2017-04-12 PROCEDURE — 85610 PROTHROMBIN TIME: CPT

## 2017-04-12 PROCEDURE — 99211 OFF/OP EST MAY X REQ PHY/QHP: CPT | Performed by: NURSE PRACTITIONER

## 2017-04-12 NOTE — MR AVS SNAPSHOT
Werner Acuña   2017 8:15 AM   Anticoagulation Visit   MRN: [id number]    Department:  Vascular Medicine   Dept Phone:  [phone]    Description:  Male : 1933   Provider:  [facility] EXAM 5           Allergies as of 2017     Allergen Noted Reactions    Nkda [No Known Drug Allergy] 02/15/2008         You were diagnosed with     Paroxysmal atrial fibrillation (CMS-HCC)   [863448]       History of DVT (deep vein thrombosis)   [218225]         Vital Signs     Smoking Status                   Never Smoker            Basic Information     Date Of Birth Sex Race Ethnicity Preferred Language    1933 Male White Non- English      Your appointments     2017  8:15 AM   Established Patient with IHV EXAM 5   [facility]  (--)    [address] [facility] [address]   [phone]            2017  3:00 PM   Established Patient with Kip Dyson M.D.   [facility] [address]    [address] [facility]  [address]  [facility] [id number] [phone]           You will be receiving a confirmation call a few days before your appointment from our automated call confirmation system.            May 24, 2017  8:15 AM   Established Patient with IHV EXAM 4   [facility]  (--)    [address] [facility] [address]   [phone]              Problem List              ICD-10-CM Priority Class Noted - Resolved    Cardiac pacemaker in situ Z95.0   2011 - Present    Chronic lymphocytic leukemia (CMS-HCC) C91.10   2011 - Present    Essential hypertension I10   2011 - Present    CKD (chronic kidney disease) stage 3, GFR 30-59 ml/min N18.3   2011 - Present    Diabetes mellitus type 2 in nonobese (CMS-HCC) E11.9   2011 - Present    SSS (sick sinus syndrome) (CMS-HCC) I49.5   2012 - Present    History of DVT (deep vein thrombosis)  Z86.718   8/25/2014 - Present    H/O vitamin D deficiency Z86.39   8/25/2014 - Present    Hyperuricemia E79.0   8/25/2014 - Present    Prostate cancer (CMS-HCC) C61   8/25/2014 - Present    Paroxysmal atrial fibrillation (CMS-HCC) I48.0   5/7/2015 - Present    Dyslipidemia E78.5   5/30/2016 - Present    Obesity (BMI 30-39.9) E66.9   1/18/2017 - Present      Health Maintenance        Date Due Completion Dates    IMM ZOSTER VACCINE 9/22/1993 ---    IMM DTaP/Tdap/Td Vaccine (1 - Tdap) 10/27/2011 10/26/2011    RETINAL SCREENING 2/15/2015 2/15/2014 (Done)    Override on 2/15/2014: Done    IMM PNEUMOCOCCAL 65+ (ADULT) HIGH/HIGHEST RISK SERIES (2 of 2 - PPSV23) 1/27/2016 12/2/2015    A1C SCREENING 7/19/2017 1/19/2017, 2/26/2015, 10/26/2011, 2/4/2007    FASTING LIPID PROFILE 11/25/2017 11/25/2016, 2/26/2015, 3/6/2012, 10/27/2011    SERUM CREATININE 12/1/2017 12/1/2016, 2/26/2015, 3/6/2012, 10/28/2011, 10/27/2011, 10/26/2011, 1/21/2011, 2/16/2008, 2/15/2008, 2/8/2007, 2/7/2007, 2/6/2007, 2/6/2007, 2/6/2007, 2/6/2007, 2/5/2007, 2/5/2007, 2/5/2007, 2/4/2007, 2/4/2007, 2/4/2007    DIABETES MONOFILAMENT / LE EXAM 1/18/2018 1/18/2017, 8/6/2014 (Done)    Override on 8/6/2014: Done    URINE ACR / MICROALBUMIN 1/19/2018 1/19/2017            Results     POCT Protime      Component    INR    2.4                        Current Immunizations     13-VALENT PCV PREVNAR 12/2/2015    Influenza Vaccine Adult HD 9/15/2016, 9/15/2015, 9/23/2013    Influenza Vaccine Quad Inj (Preserved) 9/27/2011    Pneumococcal Vaccine (UF)Historical Data 11/27/2009    TD Vaccine 10/26/2011  3:44 PM      Below and/or attached are the medications your provider expects you to take. Review all of your home medications and newly ordered medications with your provider and/or pharmacist. Follow medication instructions as directed by your provider and/or pharmacist. Please keep your medication list with you and share with your provider. Update the information when  medications are discontinued, doses are changed, or new medications (including over-the-counter products) are added; and carry medication information at all times in the event of emergency situations     Allergies:  NKDA - (reactions not documented)               Medications  Valid as of: April 12, 2017 -  7:55 AM    Generic Name Brand Name Tablet Size Instructions for use    Allopurinol (Tab) ZYLOPRIM 100 MG TAKE 1 TAB BY MOUTH EVERY DAY.        AmLODIPine Besylate (Tab) NORVASC 5 MG Take 1 Tab by mouth every day.        Azithromycin (Tab) ZITHROMAX 250 MG 2 tablets Day 1, then 1 tablet a day        Carvedilol Phosphate (CAPSULE SR 24 HR) COREG CR 20 MG Take 1 Cap by mouth every day.        Ferrous Sulfate (Tab) Iron 325 (65 FE) MG Take 1 Tab by mouth every day.        Furosemide (Tab) LASIX 40 MG Take 1 Tab by mouth every day.        Glucose Blood (Strip) glucose blood  1 Strip by Other route BID 2 DAYS A WEEK.        Insulin Detemir (Solution Pen-injector) LEVEMIR FLEXPEN 100 UNIT/ML INJECT 25 UNITS AS DIRECTED AT BEDTIME        Nateglinide (Tab) STARLIX 120 MG Take 1 Tab by mouth 2 Times a Day.        Niacin (Tab) niacin 500 MG Take 500 mg by mouth every day.        Pioglitazone HCl (Tab) ACTOS 30 MG Take 1 Tab by mouth every day. TAKE 1 TAB BY MOUTH EVERY DAY.        Pseudoephedrine HCl   Take  by mouth as needed.          Rosuvastatin Calcium (Tab) CRESTOR 10 MG Take 1 Tab by mouth every day.        Warfarin Sodium (Tab) COUMADIN 3 MG Take 0.5 Tabs by mouth every day. Followed by Dr. Palomino        .                 Medicines prescribed today were sent to:     [facility]/PHARMACY #[id number] - [place], [place] - [address] [facility]    [address] [place] [address]    Phone: [phone] Fax: [phone]    Open 24 Hours?: No      Medication refill instructions:       If your prescription bottle indicates you have medication refills left, it is not necessary to call your provider’s office. Please contact your pharmacy and they  will refill your medication.    If your prescription bottle indicates you do not have any refills left, you may request refills at any time through one of the following ways: The online WeAre.Us system (except Urgent Care), by calling your provider’s office, or by asking your pharmacy to contact your provider’s office with a refill request. Medication refills are processed only during regular business hours and may not be available until the next business day. Your provider may request additional information or to have a follow-up visit with you prior to refilling your medication.   *Please Note: Medication refills are assigned a new Rx number when refilled electronically. Your pharmacy may indicate that no refills were authorized even though a new prescription for the same medication is available at the pharmacy. Please request the medicine by name with the pharmacy before contacting your provider for a refill.        Warfarin Dosing Calendar   April 2017 Details    Sun Mon Tue Wed Thu Fri Sat           1                 2               3               4               5               6               7               8                 9               10               11               12   2.4   3 mg   See details      13      3 mg         14      3 mg         15      3 mg           16      3 mg         17      4.5 mg         18      3 mg         19      3 mg         20      3 mg         21      3 mg         22      3 mg           23      3 mg         24      4.5 mg         25      3 mg         26      3 mg         27      3 mg         28      3 mg         29      3 mg           30      3 mg                Date Details   04/12 This INR check   INR: 2.4               How to take your warfarin dose     To take:  3 mg Take 1 of the 3 mg tablets.    To take:  4.5 mg Take 1.5 of the 3 mg tablets.           Warfarin Dosing Calendar   May 2017 Details    Sun Mon Tue Wed Thu Fri Sat      1      4.5 mg         2      3 mg          3      3 mg         4      3 mg         5      3 mg         6      3 mg           7      3 mg         8      4.5 mg         9      3 mg         10      3 mg         11      3 mg         12      3 mg         13      3 mg           14      3 mg         15      4.5 mg         16      3 mg         17      3 mg         18      3 mg         19      3 mg         20      3 mg           21      3 mg         22      4.5 mg         23      3 mg         24      3 mg         25               26               27                 28               29               30               31                   Date Details   No additional details    Date of next INR:  5/24/2017         How to take your warfarin dose     To take:  3 mg Take 1 of the 3 mg tablets.    To take:  4.5 mg Take 1.5 of the 3 mg tablets.              The 3Doodler Access Code: [email or web address]  Expires: 4/16/2017 10:45 AM    The 3Doodler  A secure, online tool to manage your health information     [employer]’s The 3Doodler® is a secure, online tool that connects you to your personalized health information from the privacy of your home -- day or night - making it very easy for you to manage your healthcare. Once the activation process is completed, you can even access your medical information using the The 3Doodler jase, which is available for free in the Apple Jase store or Google Play store.     The 3Doodler provides the following levels of access (as shown below):   My Chart Features   [facility] Primary [facility] Doctor [facility]  Specialists [facility]  Urgent  Care Non-[facility]  Primary Care  Doctor   Email your healthcare team securely and privately 24/7 X X X    Manage appointments: schedule your next appointment; view details of past/upcoming appointments X      Request prescription refills. X      View recent personal medical records, including lab and immunizations X X X X   View health record, including health history, allergies, medications X X X X   Read reports about your outpatient visits,  procedures, consult and ER notes X X X X   See your discharge summary, which is a recap of your hospital and/or ER visit that includes your diagnosis, lab results, and care plan. X X       How to register for FoodFan:  1. Go to  [email or web address].  2. Click on the Sign Up Now box, which takes you to the New Member Sign Up page. You will need to provide the following information:  a. Enter your FoodFan Access Code exactly as it appears at the top of this page. (You will not need to use this code after you’ve completed the sign-up process. If you do not sign up before the expiration date, you must request a new code.)   b. Enter your date of birth.   c. Enter your home email address.   d. Click Submit, and follow the next screen’s instructions.  3. Create a Funderat ID. This will be your Funderat login ID and cannot be changed, so think of one that is secure and easy to remember.  4. Create a Funderat password. You can change your password at any time.  5. Enter your Password Reset Question and Answer. This can be used at a later time if you forget your password.   6. Enter your e-mail address. This allows you to receive e-mail notifications when new information is available in FoodFan.  7. Click Sign Up. You can now view your health information.    For assistance activating your FoodFan account, call [phone]

## 2017-04-12 NOTE — PROGRESS NOTES
Anticoagulation Summary as of 4/12/2017     INR goal 2.0-3.0   Selected INR 2.4 (4/12/2017)   Maintenance plan 4.5 mg (3 mg x 1.5) on Mon; 3 mg (3 mg x 1) all other days   Weekly total 22.5 mg   No change documented Azra Mcgraw A.P.N.   Plan last modified Aletha Junior, PHARMD (10/1/2015)   Next INR check 5/24/2017   Target end date     Indications   Paroxysmal atrial fibrillation (CMS-HCC) [I48.0]  History of DVT (deep vein thrombosis) [Z86.718]         Anticoagulation Episode Summary     INR check location [facility]    Preferred lab [facility]    Send INR reminders to     Comments Fax progress note to Dr. Prajapati [phone]      Anticoagulation Care Providers     Provider Role Specialty Phone number    Tito Prajapati M.D. Referring Cardiology [phone]    [facility] Responsible  [phone]        Patient is therapeutic today. Denies any medication or diet changes. No current symptoms of bleeding or thrombosis reported. Continue current regimen. Follow up in 6 weeks.    Next Appointment: Wednesday, May 24, 2017 at 8:15 am.    Azra NOONAN

## 2017-04-19 ENCOUNTER — OFFICE VISIT (OUTPATIENT)
Dept: MEDICAL GROUP | Facility: MEDICAL CENTER | Age: 82
End: 2017-04-19
Payer: COMMERCIAL

## 2017-04-19 VITALS
BODY MASS INDEX: 31.22 KG/M2 | HEART RATE: 60 BPM | WEIGHT: 210.76 LBS | SYSTOLIC BLOOD PRESSURE: 124 MMHG | OXYGEN SATURATION: 95 % | DIASTOLIC BLOOD PRESSURE: 70 MMHG | RESPIRATION RATE: 16 BRPM | HEIGHT: 69 IN | TEMPERATURE: 97.7 F

## 2017-04-19 DIAGNOSIS — I10 ESSENTIAL HYPERTENSION: ICD-10-CM

## 2017-04-19 DIAGNOSIS — C91.10 CHRONIC LYMPHOCYTIC LEUKEMIA (HCC): ICD-10-CM

## 2017-04-19 DIAGNOSIS — Z95.0 CARDIAC PACEMAKER IN SITU: ICD-10-CM

## 2017-04-19 DIAGNOSIS — Z79.4 TYPE 2 DIABETES MELLITUS WITHOUT COMPLICATION, WITH LONG-TERM CURRENT USE OF INSULIN (HCC): ICD-10-CM

## 2017-04-19 DIAGNOSIS — N18.30 CKD (CHRONIC KIDNEY DISEASE) STAGE 3, GFR 30-59 ML/MIN (HCC): ICD-10-CM

## 2017-04-19 DIAGNOSIS — E11.9 TYPE 2 DIABETES MELLITUS WITHOUT COMPLICATION, WITH LONG-TERM CURRENT USE OF INSULIN (HCC): ICD-10-CM

## 2017-04-19 PROCEDURE — 99214 OFFICE O/P EST MOD 30 MIN: CPT | Performed by: FAMILY MEDICINE

## 2017-04-19 RX ORDER — FUROSEMIDE 40 MG/1
40 TABLET ORAL
Qty: 90 TAB | Refills: 3 | Status: SHIPPED | OUTPATIENT
Start: 2017-04-19 | End: 2018-05-07 | Stop reason: SDUPTHER

## 2017-04-19 RX ORDER — PIOGLITAZONEHYDROCHLORIDE 30 MG/1
30 TABLET ORAL
Qty: 90 TAB | Refills: 3 | Status: SHIPPED | OUTPATIENT
Start: 2017-04-19 | End: 2018-01-16 | Stop reason: SDUPTHER

## 2017-04-19 NOTE — MR AVS SNAPSHOT
"        Werner Acuña   2017 3:00 PM   Office Visit   MRN: [id number]    Department:  [address] [place] [address]   Dept Phone:  [phone]    Description:  Male : 1933   Provider:  Kip Dyson M.D.           Reason for Visit     Follow-Up 3 month check up      Allergies as of 2017     Allergen Noted Reactions    Nkda [No Known Drug Allergy] 02/15/2008         You were diagnosed with     Type 2 diabetes mellitus without complication, with long-term current use of insulin (CMS-[facility])   [2699993]       Essential hypertension   [4734541]       Cardiac pacemaker in situ   [V45.01.ICD-9-CM]       Chronic lymphocytic leukemia (CMS-[facility])   [498755]       CKD (chronic kidney disease) stage 3, GFR 30-59 ml/min   [898304]         Vital Signs     Blood Pressure Pulse Temperature Respirations Height Weight    124/70 mmHg 60 36.5 °C (97.7 °F) 16 1.753 m (5' 9\") 95.6 kg (210 lb 12.2 oz)    Body Mass Index Oxygen Saturation Smoking Status             31.11 kg/m2 95% Never Smoker          Basic Information     Date Of Birth Sex Race Ethnicity Preferred Language    1933 Male White Non- English      Your appointments     May 24, 2017  8:15 AM   Established Patient with [facility] EXAM 4   [facility]  (--)    [address] [facility] [address]   [phone]            2017 11:00 AM   Established Patient with Kpi Dyson M.D.   [facility] [address]    [address] [facility] [address]  [facility] [address] [phone]           You will be receiving a confirmation call a few days before your appointment from our automated call confirmation system.              Problem List              ICD-10-CM Priority Class Noted - Resolved    Cardiac pacemaker in situ Z95.0   2011 - Present    Chronic lymphocytic leukemia (CMS-HCC) C91.10   2011 - Present    Essential hypertension I10   2011 - Present    CKD " (chronic kidney disease) stage 3, GFR 30-59 ml/min N18.3   9/1/2011 - Present    Diabetes mellitus type 2 in nonobese (CMS-HCC) E11.9   9/1/2011 - Present    SSS (sick sinus syndrome) (CMS-HCC) I49.5   9/19/2012 - Present    History of DVT (deep vein thrombosis) Z86.718   8/25/2014 - Present    H/O vitamin D deficiency Z86.39   8/25/2014 - Present    Hyperuricemia E79.0   8/25/2014 - Present    Prostate cancer (CMS-HCC) C61   8/25/2014 - Present    Paroxysmal atrial fibrillation (CMS-HCC) I48.0   5/7/2015 - Present    Dyslipidemia E78.5   5/30/2016 - Present    Obesity (BMI 30-39.9) E66.9   1/18/2017 - Present      Health Maintenance        Date Due Completion Dates    IMM ZOSTER VACCINE 9/22/1993 ---    IMM DTaP/Tdap/Td Vaccine (1 - Tdap) 10/27/2011 10/26/2011    RETINAL SCREENING 2/15/2015 2/15/2014 (Done)    Override on 2/15/2014: Done    IMM PNEUMOCOCCAL 65+ (ADULT) HIGH/HIGHEST RISK SERIES (2 of 2 - PPSV23) 1/27/2016 12/2/2015    A1C SCREENING 7/19/2017 1/19/2017, 2/26/2015, 10/26/2011, 2/4/2007    FASTING LIPID PROFILE 11/25/2017 11/25/2016, 2/26/2015, 3/6/2012, 10/27/2011    SERUM CREATININE 12/1/2017 12/1/2016, 2/26/2015, 3/6/2012, 10/28/2011, 10/27/2011, 10/26/2011, 1/21/2011, 2/16/2008, 2/15/2008, 2/8/2007, 2/7/2007, 2/6/2007, 2/6/2007, 2/6/2007, 2/6/2007, 2/5/2007, 2/5/2007, 2/5/2007, 2/4/2007, 2/4/2007, 2/4/2007    DIABETES MONOFILAMENT / LE EXAM 1/18/2018 1/18/2017, 8/6/2014 (Done)    Override on 8/6/2014: Done    URINE ACR / MICROALBUMIN 1/19/2018 1/19/2017            Current Immunizations     13-VALENT PCV PREVNAR 12/2/2015    Influenza Vaccine Adult HD 9/15/2016, 9/15/2015, 9/23/2013    Influenza Vaccine Quad Inj (Preserved) 9/27/2011    Pneumococcal Vaccine (UF)Historical Data 11/27/2009    TD Vaccine 10/26/2011  3:44 PM      Below and/or attached are the medications your provider expects you to take. Review all of your home medications and newly ordered medications with your provider and/or  pharmacist. Follow medication instructions as directed by your provider and/or pharmacist. Please keep your medication list with you and share with your provider. Update the information when medications are discontinued, doses are changed, or new medications (including over-the-counter products) are added; and carry medication information at all times in the event of emergency situations     Allergies:  NKDA - (reactions not documented)               Medications  Valid as of: April 19, 2017 -  3:58 PM    Generic Name Brand Name Tablet Size Instructions for use    Allopurinol (Tab) ZYLOPRIM 100 MG TAKE 1 TAB BY MOUTH EVERY DAY.        AmLODIPine Besylate (Tab) NORVASC 5 MG Take 1 Tab by mouth every day.        Azithromycin (Tab) ZITHROMAX 250 MG 2 tablets Day 1, then 1 tablet a day        Carvedilol Phosphate (CAPSULE SR 24 HR) COREG CR 20 MG Take 1 Cap by mouth every day.        Cholecalciferol (Tab) cholecalciferol 1000 UNIT Take 1,000 Units by mouth every day.        Ferrous Sulfate (Tab) Iron 325 (65 FE) MG Take 1 Tab by mouth every day.        Furosemide (Tab) LASIX 40 MG Take 1 Tab by mouth every day.        Glucose Blood (Strip) glucose blood  1 Strip by Other route BID 2 DAYS A WEEK.        Insulin Detemir (Solution Pen-injector) LEVEMIR FLEXPEN 100 UNIT/ML INJECT 25 UNITS AS DIRECTED AT BEDTIME        Nateglinide (Tab) STARLIX 120 MG Take 1 Tab by mouth 2 Times a Day.        Niacin (Tab) niacin 500 MG Take 500 mg by mouth every day.        Pioglitazone HCl (Tab) ACTOS 30 MG Take 1 Tab by mouth every day.        Pseudoephedrine HCl   Take  by mouth as needed.          Rosuvastatin Calcium (Tab) CRESTOR 10 MG Take 1 Tab by mouth every day.        Warfarin Sodium (Tab) COUMADIN 3 MG Take 0.5 Tabs by mouth every day. Followed by Dr. Palomino        .                 Medicines prescribed today were sent to:     [facility]/PHARMACY #[id number] - [place], [place] - [address]    [address] [place] [address]    Phone:  [phone] Fax: [phone]    Open 24 Hours?: No      Medication refill instructions:       If your prescription bottle indicates you have medication refills left, it is not necessary to call your provider’s office. Please contact your pharmacy and they will refill your medication.    If your prescription bottle indicates you do not have any refills left, you may request refills at any time through one of the following ways: The online [employer] system (except Urgent Care), by calling your provider’s office, or by asking your pharmacy to contact your provider’s office with a refill request. Medication refills are processed only during regular business hours and may not be available until the next business day. Your provider may request additional information or to have a follow-up visit with you prior to refilling your medication.   *Please Note: Medication refills are assigned a new Rx number when refilled electronically. Your pharmacy may indicate that no refills were authorized even though a new prescription for the same medication is available at the pharmacy. Please request the medicine by name with the pharmacy before contacting your provider for a refill.        Your To Do List     Future Labs/Procedures Complete By Expires    ECHOCARDIOGRAM-COMP W/ CONT  As directed 4/19/2018         [employer] Access Code: Activation code not generated  Current [employer] Status: Active

## 2017-04-19 NOTE — PROGRESS NOTES
CC: Multiple medical issues.    HPI:   Werner presents today for follow up of her chronic medical issues.    Type 2 diabetes mellitus, has been adequately controlled. Last A1C was 6.1.Denies excessive urination, and drinking of water, no symptoms of low blood sugar ( dizziness, and sweating). Currently on Livemere insulin 22 units daily, and Actos 30 mg daily, and Starlix 120 mg bid.No side effects.     Essential hypertension, BP is adequately controlled, patient denies lightheadedness,headache, chest pain, and SOB. She is currently on Amlodipine 5 mg , Carvedilol 25 mg daily.    Has a cardiac pacemaker in situ, for SSS. Has been stable, and asymptomatic.He follows up with cardiology Dr Prajapati  for pacemaker check up    Chronic lymphocytic leukemia , has been in an early stage, he is stable, and asymptomatic.    Chronic kidney disease) stage 3,his last eGFR was 34, Cr 1.9.has been asymptomatic.He follows up with nephrology ( Dr Harrington) as needed.    Patient Active Problem List    Diagnosis Date Noted   • Obesity (BMI 30-39.9) 01/18/2017   • Dyslipidemia 05/30/2016   • Paroxysmal atrial fibrillation (CMS-HCC) 05/07/2015   • History of DVT (deep vein thrombosis) 08/25/2014   • H/O vitamin D deficiency 08/25/2014   • Hyperuricemia 08/25/2014   • Prostate cancer (CMS-HCC) 08/25/2014   • SSS (sick sinus syndrome) (CMS-HCC) 09/19/2012   • Cardiac pacemaker in situ 09/01/2011   • Chronic lymphocytic leukemia (CMS-HCC) 09/01/2011   • Essential hypertension 09/01/2011   • CKD (chronic kidney disease) stage 3, GFR 30-59 ml/min 09/01/2011   • Diabetes mellitus type 2 in nonobese (CMS-HCC) 09/01/2011       Current Outpatient Prescriptions   Medication Sig Dispense Refill   • vitamin D (CHOLECALCIFEROL) 1000 UNIT Tab Take 1,000 Units by mouth every day.     • pioglitazone (ACTOS) 30 MG Tab Take 1 Tab by mouth every day. 90 Tab 3   • furosemide (LASIX) 40 MG Tab Take 1 Tab by mouth every day. 90 Tab 3   • Carvedilol Phosphate  "(COREG CR) 20 MG CAPSULE SR 24 HR Take 1 Cap by mouth every day. 90 Cap 2   • nateglinide (STARLIX) 120 MG Tab Take 1 Tab by mouth 2 Times a Day. 60 Tab 0   • amlodipine (NORVASC) 5 MG Tab Take 1 Tab by mouth every day. 90 Tab 1   • glucose blood (ONE TOUCH ULTRA TEST) strip 1 Strip by Other route BID 2 DAYS A WEEK. 100 Strip 3   • rosuvastatin (CRESTOR) 10 MG Tab Take 1 Tab by mouth every day. 90 Tab 3   • LEVEMIR FLEXPEN 100 UNIT/ML Solution Pen-injector injection INJECT 25 UNITS AS DIRECTED AT BEDTIME (Patient taking differently: INJECT 22 UNITS AS DIRECTED AT BEDTIME) 45 PEN 1   • allopurinol (ZYLOPRIM) 100 MG Tab TAKE 1 TAB BY MOUTH EVERY DAY. 90 Tab 3   • Pseudoephedrine HCl (SUDAFED 12 HOUR PO) Take  by mouth as needed.       • warfarin (COUMADIN) 3 MG TABS Take 0.5 Tabs by mouth every day. Followed by Dr. Palomino 1 Each 0   • Ferrous Sulfate (IRON) 325 (65 FE) MG TABS Take 1 Tab by mouth every day.     • azithromycin (ZITHROMAX) 250 MG Tab 2 tablets Day 1, then 1 tablet a day 6 Tab 0   • niacin 500 MG TABS Take 500 mg by mouth every day.       No current facility-administered medications for this visit.         Allergies as of 04/19/2017 - Fernie as Reviewed 04/19/2017   Allergen Reaction Noted   • Nkda [no known drug allergy]  02/15/2008        ROS: Denies any chest pain, Shortness of breath, Changes bowel or bladder, Lower extremity edema.    Physical Exam:  /70 mmHg  Pulse 60  Temp(Src) 36.5 °C (97.7 °F)  Resp 16  Ht 1.753 m (5' 9\")  Wt 95.6 kg (210 lb 12.2 oz)  BMI 31.11 kg/m2  SpO2 95%  Gen.: Well-developed, well-nourished, no apparent distress,pleasant and cooperative with the examination  Skin:  Warm and dry with good turgor. No rashes or suspicious lesions in visible areas  HEENT:Sinuses nontender with palpation, TMs clear, nares patent with pink mucosa and clear rhinorrhea,no septal deviation ,polyps or lesions. lips without lesions, oropharynx clear.  Neck: Trachea midline,no masses or " adenopathy. No JVD.  Cor: Regular rate and rhythm without murmur, gallop or rub.  Lungs: Respirations unlabored.Clear to auscultation with equal breath sounds bilaterally. No wheezes, rhonchi.  Extremities: No cyanosis, clubbing or edema.      Assessment and Plan.   83 y.o. male     1. Type 2 diabetes mellitus without complication, with long-term current use of insulin (CMS-HCC)  Adequately controlled. Last A1C was 6.1.  Continue on Livemere insulin 22 units daily, and Actos 30 mg daily, and Starlix 120 mg bid.    - pioglitazone (ACTOS) 30 MG Tab; Take 1 Tab by mouth every day.  Dispense: 90 Tab; Refill: 3    2. Essential hypertension  Has been adequately controlled on current medication. Denies headache, chest pain, and SOB.  Continue on Amlodipine 5 mg , Carvedilol 25 mg daily.  Will stop lasix ( no h/o CHF, no echo in records), will do an echocardiogram.    - ECHOCARDIOGRAM-COMP W/ CONT; Future    3. Cardiac pacemaker in situ  Stable, asymptomatic.  Continue follow up with cardiology Dr Prajapati  for pacemaker check up    4. Chronic lymphocytic leukemia (CMS-HCC)  Stable. Asymptomatic  Will continue observe.    5. CKD (chronic kidney disease) stage 3, GFR 30-59 ml/min  Last eGFR was 34, Cr 1.9.  Will stop the lasix ( no h/o CHF)  Stable asymptomatic.  Continue follow up with nephrology ( Dr Hernandez) as needed.  Will continue follow up kidney functions.

## 2017-04-28 DIAGNOSIS — E11.9 DIABETES MELLITUS TYPE 2 IN NONOBESE (HCC): ICD-10-CM

## 2017-04-28 RX ORDER — NATEGLINIDE 120 MG/1
120 TABLET ORAL 2 TIMES DAILY
Qty: 60 TAB | Refills: 0 | Status: SHIPPED | OUTPATIENT
Start: 2017-04-28 | End: 2017-05-30 | Stop reason: SDUPTHER

## 2017-05-24 ENCOUNTER — ANTICOAGULATION VISIT (OUTPATIENT)
Dept: VASCULAR LAB | Facility: MEDICAL CENTER | Age: 82
End: 2017-05-24
Attending: INTERNAL MEDICINE
Payer: COMMERCIAL

## 2017-05-24 DIAGNOSIS — I48.0 PAROXYSMAL ATRIAL FIBRILLATION (HCC): ICD-10-CM

## 2017-05-24 DIAGNOSIS — Z86.718 HISTORY OF DVT (DEEP VEIN THROMBOSIS): ICD-10-CM

## 2017-05-24 LAB
INR BLD: 3.1 (ref 0.9–1.2)
INR PPP: 3.1 (ref 2–3.5)

## 2017-05-24 PROCEDURE — 99212 OFFICE O/P EST SF 10 MIN: CPT | Performed by: NURSE PRACTITIONER

## 2017-05-24 PROCEDURE — 85610 PROTHROMBIN TIME: CPT

## 2017-05-24 NOTE — MR AVS SNAPSHOT
Werner Acuña   2017 8:15 AM   Anticoagulation Visit   MRN: [id number]    Department:  Vascular Medicine   Dept Phone:  [phone]    Description:  Male : 1933   Provider:  [facility] EXAM 4           Allergies as of 2017     Allergen Noted Reactions    Nkda [No Known Drug Allergy] 02/15/2008         You were diagnosed with     Paroxysmal atrial fibrillation (CMS-[facility])   [708694]       History of DVT (deep vein thrombosis)   [918976]         Vital Signs     Smoking Status                   Never Smoker            Basic Information     Date Of Birth Sex Race Ethnicity Preferred Language    1933 Male White Non- English      Your appointments     May 24, 2017  8:15 AM   Established Patient with [facility] EXAM 4   [facility]  (--)    [address] [facility]  [place] [address]   [phone]            May 29, 2017  8:15 AM   ECHO with [facility] EXAM 10   [facility]    [address] [facility] [place] [address]   [phone]           No prep            2017  8:15 AM   Established Patient with [facility] EXAM 4   [facility]  (--)    [address] [facility] [place] [address]   [phone]            2017 11:00 AM   Established Patient with Kip Dyson M.D.   [facility] [address]    [address]  [place] [address]   [phone]           You will be receiving a confirmation call a few days before your appointment from our automated call confirmation system.              Problem List              ICD-10-CM Priority Class Noted - Resolved    Cardiac pacemaker in situ Z95.0   2011 - Present    Chronic lymphocytic leukemia (CMS-HCC) C91.10   2011 - Present    Essential hypertension I10   2011 - Present    CKD (chronic kidney disease) stage 3, GFR 30-59 ml/min N18.3   2011 - Present    Diabetes mellitus  type 2 in nonobese (CMS-HCC) E11.9   9/1/2011 - Present    SSS (sick sinus syndrome) (CMS-HCC) I49.5   9/19/2012 - Present    History of DVT (deep vein thrombosis) Z86.718   8/25/2014 - Present    H/O vitamin D deficiency Z86.39   8/25/2014 - Present    Hyperuricemia E79.0   8/25/2014 - Present    Prostate cancer (CMS-HCC) C61   8/25/2014 - Present    Paroxysmal atrial fibrillation (CMS-HCC) I48.0   5/7/2015 - Present    Dyslipidemia E78.5   5/30/2016 - Present    Obesity (BMI 30-39.9) E66.9   1/18/2017 - Present      Health Maintenance        Date Due Completion Dates    IMM ZOSTER VACCINE 9/22/1993 ---    IMM DTaP/Tdap/Td Vaccine (1 - Tdap) 10/27/2011 10/26/2011    RETINAL SCREENING 2/15/2015 2/15/2014 (Done)    Override on 2/15/2014: Done    IMM PNEUMOCOCCAL 65+ (ADULT) HIGH/HIGHEST RISK SERIES (2 of 2 - PPSV23) 1/27/2016 12/2/2015    A1C SCREENING 7/19/2017 1/19/2017, 2/26/2015, 10/26/2011, 2/4/2007    FASTING LIPID PROFILE 11/25/2017 11/25/2016, 2/26/2015, 3/6/2012, 10/27/2011    SERUM CREATININE 12/1/2017 12/1/2016, 2/26/2015, 3/6/2012, 10/28/2011, 10/27/2011, 10/26/2011, 1/21/2011, 2/16/2008, 2/15/2008, 2/8/2007, 2/7/2007, 2/6/2007, 2/6/2007, 2/6/2007, 2/6/2007, 2/5/2007, 2/5/2007, 2/5/2007, 2/4/2007, 2/4/2007, 2/4/2007    DIABETES MONOFILAMENT / LE EXAM 1/18/2018 1/18/2017, 8/6/2014 (Done)    Override on 8/6/2014: Done    URINE ACR / MICROALBUMIN 1/19/2018 1/19/2017            Results     POCT Protime      Component    INR    3.1                        Current Immunizations     13-VALENT PCV PREVNAR 12/2/2015    Influenza Vaccine Adult HD 9/15/2016, 9/15/2015, 9/23/2013    Influenza Vaccine Quad Inj (Preserved) 9/27/2011    Pneumococcal Vaccine (UF)Historical Data 11/27/2009    TD Vaccine 10/26/2011  3:44 PM      Below and/or attached are the medications your provider expects you to take. Review all of your home medications and newly ordered medications with your provider and/or pharmacist. Follow medication  instructions as directed by your provider and/or pharmacist. Please keep your medication list with you and share with your provider. Update the information when medications are discontinued, doses are changed, or new medications (including over-the-counter products) are added; and carry medication information at all times in the event of emergency situations     Allergies:  NKDA - (reactions not documented)               Medications  Valid as of: May 24, 2017 -  8:03 AM    Generic Name Brand Name Tablet Size Instructions for use    Allopurinol (Tab) ZYLOPRIM 100 MG TAKE 1 TAB BY MOUTH EVERY DAY.        AmLODIPine Besylate (Tab) NORVASC 5 MG Take 1 Tab by mouth every day.        Azithromycin (Tab) ZITHROMAX 250 MG 2 tablets Day 1, then 1 tablet a day        Carvedilol Phosphate (CAPSULE SR 24 HR) COREG CR 20 MG Take 1 Cap by mouth every day.        Cholecalciferol (Tab) cholecalciferol 1000 UNIT Take 1,000 Units by mouth every day.        Ferrous Sulfate (Tab) Iron 325 (65 FE) MG Take 1 Tab by mouth every day.        Furosemide (Tab) LASIX 40 MG Take 1 Tab by mouth every day.        Glucose Blood (Strip) glucose blood  1 Strip by Other route BID 2 DAYS A WEEK.        Insulin Detemir (Solution Pen-injector) LEVEMIR FLEXPEN 100 UNIT/ML INJECT 25 UNITS AS DIRECTED AT BEDTIME        Nateglinide (Tab) STARLIX 120 MG Take 1 Tab by mouth 2 Times a Day.        Niacin (Tab) niacin 500 MG Take 500 mg by mouth every day.        Pioglitazone HCl (Tab) ACTOS 30 MG Take 1 Tab by mouth every day.        Pseudoephedrine HCl   Take  by mouth as needed.          Rosuvastatin Calcium (Tab) CRESTOR 10 MG Take 1 Tab by mouth every day.        Warfarin Sodium (Tab) COUMADIN 3 MG Take 0.5 Tabs by mouth every day. Followed by Dr. Palomino        .                 Medicines prescribed today were sent to:     [facility]/PHARMACY #[id number] - [place] - [address]    [address] [place] [address] [place] [address]    Phone: [phone] Fax: [phone]    Open  24 Hours?: No      Medication refill instructions:       If your prescription bottle indicates you have medication refills left, it is not necessary to call your provider’s office. Please contact your pharmacy and they will refill your medication.    If your prescription bottle indicates you do not have any refills left, you may request refills at any time through one of the following ways: The online [employer] system (except Urgent Care), by calling your provider’s office, or by asking your pharmacy to contact your provider’s office with a refill request. Medication refills are processed only during regular business hours and may not be available until the next business day. Your provider may request additional information or to have a follow-up visit with you prior to refilling your medication.   *Please Note: Medication refills are assigned a new Rx number when refilled electronically. Your pharmacy may indicate that no refills were authorized even though a new prescription for the same medication is available at the pharmacy. Please request the medicine by name with the pharmacy before contacting your provider for a refill.        Warfarin Dosing Calendar   May 2017 Details    Sun Mon Tue Wed Thu Fri Sat      1               2               3               4               5               6                 7               8               9               10               11               12               13                 14               15               16               17               18               19               20                 21               22               23               24   3.1   1.5 mg   See details      25      3 mg         26      3 mg         27      3 mg           28      3 mg         29      4.5 mg         30      3 mg         31      3 mg             Date Details   05/24 This INR check   INR: 3.1               How to take your warfarin dose     To take:  1.5 mg Take 0.5 of a 3 mg  tablet.    To take:  3 mg Take 1 of the 3 mg tablets.    To take:  4.5 mg Take 1.5 of the 3 mg tablets.           Warfarin Dosing Calendar   June 2017 Details    Sun Mon Tue Wed Thu Fri Sat         1      3 mg         2      3 mg         3      3 mg           4      3 mg         5      4.5 mg         6      3 mg         7      3 mg         8      3 mg         9      3 mg         10      3 mg           11      3 mg         12      4.5 mg         13      3 mg         14      3 mg         15               16               17                 18               19               20               21               22               23               24                 25               26               27               28               29               30                 Date Details   No additional details    Date of next INR:  6/14/2017         How to take your warfarin dose     To take:  3 mg Take 1 of the 3 mg tablets.    To take:  4.5 mg Take 1.5 of the 3 mg tablets.              [employer] Access Code: Activation code not generated  Current [employer] Status: Active

## 2017-05-24 NOTE — PROGRESS NOTES
Anticoagulation Summary as of 5/24/2017     INR goal 2.0-3.0   Selected INR 3.1! (5/24/2017)   Maintenance plan 4.5 mg (3 mg x 1.5) on Mon; 3 mg (3 mg x 1) all other days   Weekly total 22.5 mg   Plan last modified Aletha Junior, PHARMD (10/1/2015)   Next INR check 6/14/2017   Target end date     Indications   Paroxysmal atrial fibrillation (CMS-HCC) [I48.0]  History of DVT (deep vein thrombosis) [Z86.718]         Anticoagulation Episode Summary     [facility]    Preferred lab [facility]    Send INR reminders to     Comments Fax progress note to Dr. Prajapati [phone]      Anticoagulation Care Providers     Provider Role Specialty Phone number    Tito Prajapati M.D. Referring Cardiology [phone]    [facility] Anticoagulation Services Responsible  [phone]        Patient is supratherapeutic today. Denies any medication or diet changes. No current symptoms of bleeding or thrombosis reported. Decrease tonight then continue current regimen. Follow up in 3 weeks.    Next Appointment: Wednesday, June 14, 2017 at 8:15 am.      Azra NOONAN

## 2017-05-29 ENCOUNTER — HOSPITAL ENCOUNTER (OUTPATIENT)
Dept: CARDIOLOGY | Facility: MEDICAL CENTER | Age: 82
End: 2017-05-29
Attending: FAMILY MEDICINE
Payer: COMMERCIAL

## 2017-05-29 DIAGNOSIS — I10 ESSENTIAL HYPERTENSION: ICD-10-CM

## 2017-05-29 LAB
LV EJECT FRACT  99904: 60
LV EJECT FRACT MOD 2C 99903: 65.19
LV EJECT FRACT MOD 4C 99902: 71.06
LV EJECT FRACT MOD BP 99901: 68.87

## 2017-05-29 PROCEDURE — 93306 TTE W/DOPPLER COMPLETE: CPT

## 2017-05-29 PROCEDURE — 93306 TTE W/DOPPLER COMPLETE: CPT | Mod: 26 | Performed by: INTERNAL MEDICINE

## 2017-05-30 DIAGNOSIS — E11.9 DIABETES MELLITUS TYPE 2 IN NONOBESE (HCC): ICD-10-CM

## 2017-05-30 RX ORDER — NATEGLINIDE 120 MG/1
120 TABLET ORAL 2 TIMES DAILY
Qty: 60 TAB | Refills: 1 | Status: SHIPPED | OUTPATIENT
Start: 2017-05-30 | End: 2017-07-28 | Stop reason: SDUPTHER

## 2017-06-12 RX ORDER — ALLOPURINOL 100 MG/1
100 TABLET ORAL
Qty: 90 TAB | Refills: 3 | Status: SHIPPED | OUTPATIENT
Start: 2017-06-12 | End: 2018-06-02 | Stop reason: SDUPTHER

## 2017-06-14 ENCOUNTER — ANTICOAGULATION VISIT (OUTPATIENT)
Dept: VASCULAR LAB | Facility: MEDICAL CENTER | Age: 82
End: 2017-06-14
Attending: INTERNAL MEDICINE
Payer: COMMERCIAL

## 2017-06-14 DIAGNOSIS — I48.0 PAROXYSMAL ATRIAL FIBRILLATION (HCC): ICD-10-CM

## 2017-06-14 DIAGNOSIS — Z86.718 HISTORY OF DVT (DEEP VEIN THROMBOSIS): ICD-10-CM

## 2017-06-14 LAB
INR BLD: 2.9 (ref 0.9–1.2)
INR PPP: 2.9 (ref 2–3.5)

## 2017-06-14 PROCEDURE — 99211 OFF/OP EST MAY X REQ PHY/QHP: CPT | Performed by: NURSE PRACTITIONER

## 2017-06-14 PROCEDURE — 85610 PROTHROMBIN TIME: CPT

## 2017-06-14 NOTE — PROGRESS NOTES
Anticoagulation Summary as of 6/14/2017     INR goal 2.0-3.0   Selected INR 2.9 (6/14/2017)   Maintenance plan 4.5 mg (3 mg x 1.5) on Mon; 3 mg (3 mg x 1) all other days   Weekly total 22.5 mg   Plan last modified Aletha Junior, PHARMD (10/1/2015)   Next INR check 7/12/2017   Target end date     Indications   Paroxysmal atrial fibrillation (CMS-HCC) [I48.0]  History of DVT (deep vein thrombosis) [Z86.718]         Anticoagulation Episode Summary     [facility]    Preferred lab [facility]    Send INR reminders to     Comments Fax progress note to Dr. Prajapati [phone]      Anticoagulation Care Providers     Provider Role Specialty Phone number    Tito Prajapati M.D. Referring Cardiology [phone]    [facility] Anticoagulation Services Responsible  [phone]        Patient is therapeutic today. Denies any medication or diet changes. No current symptoms of bleeding or thrombosis reported. Continue current regimen. Follow up in 4 weeks.    Next Appointment: Wednesday, July 12, 2017 at 8:15 am.     Azra NOONAN

## 2017-06-14 NOTE — MR AVS SNAPSHOT
Werner Acuña   2017 8:15 AM   Anticoagulation Visit   MRN: [id number]    Department:  Vascular Medicine   Dept Phone:  [phone]    Description:  Male : 1933   Provider:  [facility] EXAM 4           Allergies as of 2017     Allergen Noted Reactions    Nkda [No Known Drug Allergy] 02/15/2008         You were diagnosed with     Paroxysmal atrial fibrillation (CMS-HCC)   [771364]       History of DVT (deep vein thrombosis)   [173028]         Vital Signs     Smoking Status                   Never Smoker            Basic Information     Date Of Birth Sex Race Ethnicity Preferred Language    1933 Male White Non- English      Your appointments     2017  8:15 AM   Established Patient with [facility] EXAM 4   [facility]  (--)    [address] [place] [address]   [phone]            2017  8:15 AM   Established Patient with [facility] EXAM 5   [facility]  (--)    [address] [place] [address]   [phone]            2017 11:00 AM   Established Patient with Kip Dyson M.D.   [facility] [address]    [address]  [address]  [facility] [id number] [phone]           You will be receiving a confirmation call a few days before your appointment from our automated call confirmation system.              Problem List              ICD-10-CM Priority Class Noted - Resolved    Cardiac pacemaker in situ Z95.0   2011 - Present    Chronic lymphocytic leukemia (CMS-HCC) C91.10   2011 - Present    Essential hypertension I10   2011 - Present    CKD (chronic kidney disease) stage 3, GFR 30-59 ml/min N18.3   2011 - Present    Diabetes mellitus type 2 in nonobese (CMS-HCC) E11.9   2011 - Present    SSS (sick sinus syndrome) (CMS-HCC) I49.5   2012 - Present    History of DVT (deep vein thrombosis)  Z86.718   8/25/2014 - Present    H/O vitamin D deficiency Z86.39   8/25/2014 - Present    Hyperuricemia E79.0   8/25/2014 - Present    Prostate cancer (CMS-HCC) C61   8/25/2014 - Present    Paroxysmal atrial fibrillation (CMS-HCC) I48.0   5/7/2015 - Present    Dyslipidemia E78.5   5/30/2016 - Present    Obesity (BMI 30-39.9) E66.9   1/18/2017 - Present      Health Maintenance        Date Due Completion Dates    IMM ZOSTER VACCINE 9/22/1993 ---    IMM DTaP/Tdap/Td Vaccine (1 - Tdap) 10/27/2011 10/26/2011    RETINAL SCREENING 2/15/2015 2/15/2014 (Done)    Override on 2/15/2014: Done    IMM PNEUMOCOCCAL 65+ (ADULT) HIGH/HIGHEST RISK SERIES (2 of 2 - PPSV23) 1/27/2016 12/2/2015    A1C SCREENING 7/19/2017 1/19/2017, 2/26/2015, 10/26/2011, 2/4/2007    FASTING LIPID PROFILE 11/25/2017 11/25/2016, 2/26/2015, 3/6/2012, 10/27/2011    SERUM CREATININE 12/1/2017 12/1/2016, 2/26/2015, 3/6/2012, 10/28/2011, 10/27/2011, 10/26/2011, 1/21/2011, 2/16/2008, 2/15/2008, 2/8/2007, 2/7/2007, 2/6/2007, 2/6/2007, 2/6/2007, 2/6/2007, 2/5/2007, 2/5/2007, 2/5/2007, 2/4/2007, 2/4/2007, 2/4/2007    DIABETES MONOFILAMENT / LE EXAM 1/18/2018 1/18/2017, 8/6/2014 (Done)    Override on 8/6/2014: Done    URINE ACR / MICROALBUMIN 1/19/2018 1/19/2017            Results     POCT Protime      Component    INR    2.9                        Current Immunizations     13-VALENT PCV PREVNAR 12/2/2015    Influenza Vaccine Adult HD 9/15/2016, 9/15/2015, 9/23/2013    Influenza Vaccine Quad Inj (Preserved) 9/27/2011    Pneumococcal Vaccine (UF)Historical Data 11/27/2009    TD Vaccine 10/26/2011  3:44 PM      Below and/or attached are the medications your provider expects you to take. Review all of your home medications and newly ordered medications with your provider and/or pharmacist. Follow medication instructions as directed by your provider and/or pharmacist. Please keep your medication list with you and share with your provider. Update the information when  medications are discontinued, doses are changed, or new medications (including over-the-counter products) are added; and carry medication information at all times in the event of emergency situations     Allergies:  NKDA - (reactions not documented)               Medications  Valid as of: June 14, 2017 -  7:57 AM    Generic Name Brand Name Tablet Size Instructions for use    Allopurinol (Tab) ZYLOPRIM 100 MG Take 1 Tab by mouth every day. TAKE 1 TAB BY MOUTH EVERY DAY.        AmLODIPine Besylate (Tab) NORVASC 5 MG Take 1 Tab by mouth every day.        Azithromycin (Tab) ZITHROMAX 250 MG 2 tablets Day 1, then 1 tablet a day        Carvedilol Phosphate (CAPSULE SR 24 HR) COREG CR 20 MG Take 1 Cap by mouth every day.        Cholecalciferol (Tab) cholecalciferol 1000 UNIT Take 1,000 Units by mouth every day.        Ferrous Sulfate (Tab) Iron 325 (65 FE) MG Take 1 Tab by mouth every day.        Furosemide (Tab) LASIX 40 MG Take 1 Tab by mouth every day.        Glucose Blood (Strip) glucose blood  1 Strip by Other route BID 2 DAYS A WEEK.        Insulin Detemir (Solution Pen-injector) LEVEMIR FLEXPEN 100 UNIT/ML INJECT 25 UNITS AS DIRECTED AT BEDTIME        Nateglinide (Tab) STARLIX 120 MG Take 1 Tab by mouth 2 Times a Day.        Niacin (Tab) niacin 500 MG Take 500 mg by mouth every day.        Pioglitazone HCl (Tab) ACTOS 30 MG Take 1 Tab by mouth every day.        Pseudoephedrine HCl   Take  by mouth as needed.          Rosuvastatin Calcium (Tab) CRESTOR 10 MG Take 1 Tab by mouth every day.        Warfarin Sodium (Tab) COUMADIN 3 MG Take 0.5 Tabs by mouth every day. Followed by Dr. Palomino        .                 Medicines prescribed today were sent to:     [facility]/PHARMACY #[id number] - [place], [place] - [address]    [address] [place] [address] [place] [address]    Phone: [phone] Fax: [phone]    Open 24 Hours?: No      Medication refill instructions:       If your prescription bottle indicates you have medication refills left,  it is not necessary to call your provider’s office. Please contact your pharmacy and they will refill your medication.    If your prescription bottle indicates you do not have any refills left, you may request refills at any time through one of the following ways: The online [employer] system (except Urgent Care), by calling your provider’s office, or by asking your pharmacy to contact your provider’s office with a refill request. Medication refills are processed only during regular business hours and may not be available until the next business day. Your provider may request additional information or to have a follow-up visit with you prior to refilling your medication.   *Please Note: Medication refills are assigned a new Rx number when refilled electronically. Your pharmacy may indicate that no refills were authorized even though a new prescription for the same medication is available at the pharmacy. Please request the medicine by name with the pharmacy before contacting your provider for a refill.        Warfarin Dosing Calendar   June 2017 Details    Sun Mon Tue Wed Thu Fri Sat         1               2               3                 4               5               6               7               8               9               10                 11               12               13               14   2.9   3 mg   See details      15      3 mg         16      3 mg         17      3 mg           18      3 mg         19      4.5 mg         20      3 mg         21      3 mg         22      3 mg         23      3 mg         24      3 mg           25      3 mg         26      4.5 mg         27      3 mg         28      3 mg         29      3 mg         30      3 mg           Date Details   06/14 This INR check   INR: 2.9               How to take your warfarin dose     To take:  3 mg Take 1 of the 3 mg tablets.    To take:  4.5 mg Take 1.5 of the 3 mg tablets.           Warfarin Dosing Calendar   July 2017 Details       Sun Mon Tue Wed Thu Fri Sat           1      3 mg           2      3 mg         3      4.5 mg         4      3 mg         5      3 mg         6      3 mg         7      3 mg         8      3 mg           9      3 mg         10      4.5 mg         11      3 mg         12      3 mg         13               14               15                 16               17               18               19               20               21               22                 23               24               25               26               27               28               29                 30               31                     Date Details   No additional details    Date of next INR:  7/12/2017         How to take your warfarin dose     To take:  3 mg Take 1 of the 3 mg tablets.    To take:  4.5 mg Take 1.5 of the 3 mg tablets.              [employer] Access Code: Activation code not generated  Current [employer] Status: Active

## 2017-07-07 DIAGNOSIS — I10 ESSENTIAL HYPERTENSION: ICD-10-CM

## 2017-07-07 RX ORDER — AMLODIPINE BESYLATE 5 MG/1
5 TABLET ORAL
Qty: 90 TAB | Refills: 1 | Status: SHIPPED | OUTPATIENT
Start: 2017-07-07 | End: 2017-12-20 | Stop reason: SDUPTHER

## 2017-07-12 ENCOUNTER — ANTICOAGULATION VISIT (OUTPATIENT)
Dept: VASCULAR LAB | Facility: MEDICAL CENTER | Age: 82
End: 2017-07-12
Attending: INTERNAL MEDICINE
Payer: COMMERCIAL

## 2017-07-12 DIAGNOSIS — I48.0 PAROXYSMAL ATRIAL FIBRILLATION (HCC): ICD-10-CM

## 2017-07-12 DIAGNOSIS — Z86.718 HISTORY OF DVT (DEEP VEIN THROMBOSIS): ICD-10-CM

## 2017-07-12 LAB
INR BLD: 1.2 (ref 0.9–1.2)
INR PPP: 1.2 (ref 2–3.5)

## 2017-07-12 PROCEDURE — 85610 PROTHROMBIN TIME: CPT

## 2017-07-12 PROCEDURE — 99212 OFFICE O/P EST SF 10 MIN: CPT

## 2017-07-12 NOTE — PROGRESS NOTES
Anticoagulation Summary as of 7/12/2017     INR goal 2.0-3.0   Selected INR 1.2! (7/12/2017)   Maintenance plan 4.5 mg (3 mg x 1.5) on Mon; 3 mg (3 mg x 1) all other days   Weekly total 22.5 mg   Plan last modified Aletha AURELIO Juinor, PHARMD (10/1/2015)   Next INR check 7/24/2017   Target end date     Indications   Paroxysmal atrial fibrillation (CMS-HCC) [I48.0]  History of DVT (deep vein thrombosis) [Z86.718]         Anticoagulation Episode Summary     [facility]    Preferred lab [facility]    Send INR reminders to     Comments Fax progress note to Dr. Prajapati [phone]      Anticoagulation Care Providers     Provider Role Specialty Phone number    Tito Prajapati M.D. Referring Cardiology [phone]    [facility] Anticoagulation Services Responsible  [phone]        Anticoagulation Patient Findings    Patients INR was subtherapeutic today at 1.2 .Patient denied any signs/symptoms of bleeding or bruising.     Patient stated that he has a procedure scheduled 07/19/2017 and was instructed to hold warfarin (7 days prior to procedure), while speaking to patient he was confused and states that he has been holding warfarin for the past 3 days which will total up to 10 days from his procedure date. I will not re-start warfarin at this time due to his procedure being a week away.    Patient was instructed to continue holding warfarin until procedure and Bolus with 6 mg X 2 days beginning evening of procedure, then continue on regular regimen, unless told otherwise . Patient has a CHADS-VASc of 4 (Age 83, HTN, DM) / CHADS of 3 , patients last DVT was over 10 years ago per patient and no Hx of stroke. I will not consider bridging at this point . Follow up on Monday after procedure  07/24/2017 (I tried to have patient follow up sooner but he will not be able to make it).    Felton Saini, Pharm.D

## 2017-07-12 NOTE — MR AVS SNAPSHOT
Werner Acuña   2017 8:15 AM   Anticoagulation Visit   MRN: [id number]    Department:  Vascular Medicine   Dept Phone:  [phone]    Description:  Male : 1933   Provider:  [facility] EXAM 5           Allergies as of 2017     Allergen Noted Reactions    Nkda [No Known Drug Allergy] 02/15/2008         You were diagnosed with     Paroxysmal atrial fibrillation (CMS-HCC)   [575745]       History of DVT (deep vein thrombosis)   [486084]         Vital Signs     Smoking Status                   Never Smoker            Basic Information     Date Of Birth Sex Race Ethnicity Preferred Language    1933 Male White Non- English      Your appointments     2017 11:00 AM   Established Patient with Kip Dyson M.D.   [facility] [address]    [address]  [address]  [place] [address]   [phone]           You will be receiving a confirmation call a few days before your appointment from our automated call confirmation system.            2017  8:15 AM   Established Patient with [facility] EXAM 4   [facility]  (--)    [address]  [place] [address]   [phone]              Problem List              ICD-10-CM Priority Class Noted - Resolved    Cardiac pacemaker in situ Z95.0   2011 - Present    Chronic lymphocytic leukemia (CMS-HCC) C91.10   2011 - Present    Essential hypertension I10   2011 - Present    CKD (chronic kidney disease) stage 3, GFR 30-59 ml/min N18.3   2011 - Present    Diabetes mellitus type 2 in nonobese (CMS-HCC) E11.9   2011 - Present    SSS (sick sinus syndrome) (CMS-HCC) I49.5   2012 - Present    History of DVT (deep vein thrombosis) Z86.718   2014 - Present    H/O vitamin D deficiency Z86.39   2014 - Present    Hyperuricemia E79.0   2014 - Present    Prostate cancer (CMS-HCC) C61   2014 - Present    Paroxysmal atrial  fibrillation (CMS-HCC) I48.0   5/7/2015 - Present    Dyslipidemia E78.5   5/30/2016 - Present    Obesity (BMI 30-39.9) E66.9   1/18/2017 - Present      Health Maintenance        Date Due Completion Dates    IMM ZOSTER VACCINE 9/22/1993 ---    IMM DTaP/Tdap/Td Vaccine (1 - Tdap) 10/27/2011 10/26/2011    RETINAL SCREENING 2/15/2015 2/15/2014 (Done)    Override on 2/15/2014: Done    IMM PNEUMOCOCCAL 65+ (ADULT) HIGH/HIGHEST RISK SERIES (2 of 2 - PPSV23) 1/27/2016 12/2/2015    A1C SCREENING 7/19/2017 1/19/2017, 2/26/2015, 10/26/2011, 2/4/2007    IMM INFLUENZA (1) 9/1/2017 9/15/2016, 9/15/2015, 9/23/2013, 9/27/2011    FASTING LIPID PROFILE 11/25/2017 11/25/2016, 2/26/2015, 3/6/2012, 10/27/2011    SERUM CREATININE 12/1/2017 12/1/2016, 2/26/2015, 3/6/2012, 10/28/2011, 10/27/2011, 10/26/2011, 1/21/2011, 2/16/2008, 2/15/2008, 2/8/2007, 2/7/2007, 2/6/2007, 2/6/2007, 2/6/2007, 2/6/2007, 2/5/2007, 2/5/2007, 2/5/2007, 2/4/2007, 2/4/2007, 2/4/2007    DIABETES MONOFILAMENT / LE EXAM 1/18/2018 1/18/2017, 8/6/2014 (Done)    Override on 8/6/2014: Done    URINE ACR / MICROALBUMIN 1/19/2018 1/19/2017            Results     POCT Protime      Component    INR    1.2                        Current Immunizations     13-VALENT PCV PREVNAR 12/2/2015    Influenza Vaccine Adult HD 9/15/2016, 9/15/2015, 9/23/2013    Influenza Vaccine Quad Inj (Preserved) 9/27/2011    Pneumococcal Vaccine (UF)Historical Data 11/27/2009    TD Vaccine 10/26/2011  3:44 PM      Below and/or attached are the medications your provider expects you to take. Review all of your home medications and newly ordered medications with your provider and/or pharmacist. Follow medication instructions as directed by your provider and/or pharmacist. Please keep your medication list with you and share with your provider. Update the information when medications are discontinued, doses are changed, or new medications (including over-the-counter products) are added; and carry medication  information at all times in the event of emergency situations     Allergies:  NKDA - (reactions not documented)               Medications  Valid as of: July 12, 2017 -  8:30 AM    Generic Name Brand Name Tablet Size Instructions for use    Allopurinol (Tab) ZYLOPRIM 100 MG Take 1 Tab by mouth every day. TAKE 1 TAB BY MOUTH EVERY DAY.        AmLODIPine Besylate (Tab) NORVASC 5 MG Take 1 Tab by mouth every day.        Azithromycin (Tab) ZITHROMAX 250 MG 2 tablets Day 1, then 1 tablet a day        Carvedilol Phosphate (CAPSULE SR 24 HR) COREG CR 20 MG Take 1 Cap by mouth every day.        Cholecalciferol (Tab) cholecalciferol 1000 UNIT Take 1,000 Units by mouth every day.        Ferrous Sulfate (Tab) Iron 325 (65 FE) MG Take 1 Tab by mouth every day.        Furosemide (Tab) LASIX 40 MG Take 1 Tab by mouth every day.        Glucose Blood (Strip) glucose blood  1 Strip by Other route BID 2 DAYS A WEEK.        Insulin Detemir (Solution Pen-injector) LEVEMIR FLEXPEN 100 UNIT/ML INJECT 25 UNITS AS DIRECTED AT BEDTIME        Nateglinide (Tab) STARLIX 120 MG Take 1 Tab by mouth 2 Times a Day.        Niacin (Tab) niacin 500 MG Take 500 mg by mouth every day.        Pioglitazone HCl (Tab) ACTOS 30 MG Take 1 Tab by mouth every day.        Pseudoephedrine HCl   Take  by mouth as needed.          Rosuvastatin Calcium (Tab) CRESTOR 10 MG Take 1 Tab by mouth every day.        Warfarin Sodium (Tab) COUMADIN 3 MG Take 0.5 Tabs by mouth every day. Followed by Dr. Palomino        .                 Medicines prescribed today were sent to:     [facility]/PHARMACY #[id number] - [place] - [address] [facility]    [address] [place] [address] [place] [address]    Phone: [phone] Fax: [phone]    Open 24 Hours?: No      Medication refill instructions:       If your prescription bottle indicates you have medication refills left, it is not necessary to call your provider’s office. Please contact your pharmacy and they will refill your medication.    If your  prescription bottle indicates you do not have any refills left, you may request refills at any time through one of the following ways: The online [employer] system (except Urgent Care), by calling your provider’s office, or by asking your pharmacy to contact your provider’s office with a refill request. Medication refills are processed only during regular business hours and may not be available until the next business day. Your provider may request additional information or to have a follow-up visit with you prior to refilling your medication.   *Please Note: Medication refills are assigned a new Rx number when refilled electronically. Your pharmacy may indicate that no refills were authorized even though a new prescription for the same medication is available at the pharmacy. Please request the medicine by name with the pharmacy before contacting your provider for a refill.        Warfarin Dosing Calendar   July 2017 Details    Sun Mon Tue Wed Thu Fri Sat           1                 2               3               4               5               6               7               8                 9               10               11               12   1.2   Hold   See details      13      Hold         14      Hold         15      Hold           16      Hold         17      Hold         18      Hold         19      6 mg         20      6 mg         21      3 mg         22      3 mg           23      3 mg         24      4.5 mg         25               26               27               28               29                 30               31                     Date Details   07/12 This INR check   INR: 1.2       Date of next INR:  7/24/2017         How to take your warfarin dose     To take:  3 mg Take 1 of the 3 mg tablets.    To take:  4.5 mg Take 1.5 of the 3 mg tablets.    To take:  6 mg Take 2 of the 3 mg tablets.    Hold Do not take your warfarin dose. See the Details table to the right for additional instructions.                      [employer] Access Code: Activation code not generated  Current [employer] Status: Active

## 2017-07-19 ENCOUNTER — OFFICE VISIT (OUTPATIENT)
Dept: MEDICAL GROUP | Facility: MEDICAL CENTER | Age: 82
End: 2017-07-19
Payer: COMMERCIAL

## 2017-07-19 VITALS
BODY MASS INDEX: 31.54 KG/M2 | SYSTOLIC BLOOD PRESSURE: 110 MMHG | HEART RATE: 64 BPM | WEIGHT: 212.96 LBS | DIASTOLIC BLOOD PRESSURE: 70 MMHG | OXYGEN SATURATION: 94 % | HEIGHT: 69 IN | TEMPERATURE: 98.2 F | RESPIRATION RATE: 14 BRPM

## 2017-07-19 DIAGNOSIS — Z00.00 HEALTH CARE MAINTENANCE: ICD-10-CM

## 2017-07-19 DIAGNOSIS — I10 ESSENTIAL HYPERTENSION: ICD-10-CM

## 2017-07-19 DIAGNOSIS — C61 PROSTATE CANCER (HCC): ICD-10-CM

## 2017-07-19 DIAGNOSIS — E66.9 DIABETES MELLITUS TYPE 2 IN OBESE (HCC): ICD-10-CM

## 2017-07-19 DIAGNOSIS — E11.69 DIABETES MELLITUS TYPE 2 IN OBESE (HCC): ICD-10-CM

## 2017-07-19 DIAGNOSIS — I48.0 PAROXYSMAL ATRIAL FIBRILLATION (HCC): ICD-10-CM

## 2017-07-19 DIAGNOSIS — E78.5 DYSLIPIDEMIA: ICD-10-CM

## 2017-07-19 PROCEDURE — 99214 OFFICE O/P EST MOD 30 MIN: CPT | Performed by: FAMILY MEDICINE

## 2017-07-19 ASSESSMENT — PATIENT HEALTH QUESTIONNAIRE - PHQ9: CLINICAL INTERPRETATION OF PHQ2 SCORE: 0

## 2017-07-19 NOTE — MR AVS SNAPSHOT
"        Werner Acuña   2017 11:00 AM   Office Visit   MRN: [id number]    Department:  [address] [place] [address]   Dept Phone:  [phone]    Description:  Male : 1933   Provider:  Kip Dyson M.D.           Reason for Visit     Follow-Up 3 month check up. Is going to have radiation on his prostate      Allergies as of 2017     Allergen Noted Reactions    Nkda [No Known Drug Allergy] 02/15/2008         You were diagnosed with     Diabetes mellitus type 2 in obese (CMS-HCC)   [210882]       Essential hypertension   [2965736]       Dyslipidemia   [481882]       Paroxysmal atrial fibrillation (CMS-HCC)   [117947]       Prostate cancer (CMS-HCC)   [013887]       Health care maintenance   [280074]         Vital Signs     Blood Pressure Pulse Temperature Respirations Height Weight    110/70 mmHg 64 36.8 °C (98.2 °F) 14 1.753 m (5' 9\") 96.6 kg (212 lb 15.4 oz)    Body Mass Index Oxygen Saturation Smoking Status             31.43 kg/m2 94% Never Smoker          Basic Information     Date Of Birth Sex Race Ethnicity Preferred Language    1933 Male White Non- English      Your appointments     2017  8:15 AM   Established Patient with [facility] EXAM 4   [facility]  (--)    [address] [facility] [address]   [phone]            Oct 25, 2017 11:40 AM   Established Patient with Kip Dyson M.D.   [facility] [address]    [address] [facility] [address]  [facility] [id number] [phone]           You will be receiving a confirmation call a few days before your appointment from our automated call confirmation system.              Problem List              ICD-10-CM Priority Class Noted - Resolved    Cardiac pacemaker in situ Z95.0   2011 - Present    Chronic lymphocytic leukemia (CMS-HCC) C91.10   2011 - Present    Essential hypertension I10   2011 - Present    CKD (chronic " kidney disease) stage 3, GFR 30-59 ml/min N18.3   9/1/2011 - Present    Diabetes mellitus type 2 in nonobese (CMS-HCC) E11.9   9/1/2011 - Present    SSS (sick sinus syndrome) (CMS-HCC) I49.5   9/19/2012 - Present    History of DVT (deep vein thrombosis) Z86.718   8/25/2014 - Present    H/O vitamin D deficiency Z86.39   8/25/2014 - Present    Hyperuricemia E79.0   8/25/2014 - Present    Prostate cancer (CMS-HCC) C61   8/25/2014 - Present    Paroxysmal atrial fibrillation (CMS-HCC) I48.0   5/7/2015 - Present    Dyslipidemia E78.5   5/30/2016 - Present    Obesity (BMI 30-39.9) E66.9   1/18/2017 - Present      Health Maintenance        Date Due Completion Dates    COLON CANCER SCREENING ANNUAL FIT 9/22/1983 ---    IMM ZOSTER VACCINE 9/22/1993 ---    IMM DTaP/Tdap/Td Vaccine (1 - Tdap) 10/27/2011 10/26/2011    IMM PNEUMOCOCCAL 65+ (ADULT) HIGH/HIGHEST RISK SERIES (2 of 2 - PPSV23) 1/27/2016 12/2/2015    A1C SCREENING 7/19/2017 1/19/2017, 2/26/2015, 10/26/2011, 2/4/2007    IMM INFLUENZA (1) 9/1/2017 9/15/2016, 9/15/2015, 9/23/2013, 9/27/2011    FASTING LIPID PROFILE 11/25/2017 11/25/2016, 2/26/2015, 3/6/2012, 10/27/2011    SERUM CREATININE 12/1/2017 12/1/2016, 2/26/2015, 3/6/2012, 10/28/2011, 10/27/2011, 10/26/2011, 1/21/2011, 2/16/2008, 2/15/2008, 2/8/2007, 2/7/2007, 2/6/2007, 2/6/2007, 2/6/2007, 2/6/2007, 2/5/2007, 2/5/2007, 2/5/2007, 2/4/2007, 2/4/2007, 2/4/2007    RETINAL SCREENING 12/13/2017 12/13/2016 (Done), 2/15/2014 (Done)    Override on 12/13/2016: Done    Override on 2/15/2014: Done    DIABETES MONOFILAMENT / LE EXAM 1/18/2018 1/18/2017, 8/6/2014 (Done)    Override on 8/6/2014: Done    URINE ACR / MICROALBUMIN 1/19/2018 1/19/2017            Current Immunizations     13-VALENT PCV PREVNAR 12/2/2015    Influenza Vaccine Adult HD 9/15/2016, 9/15/2015, 9/23/2013    Influenza Vaccine Quad Inj (Preserved) 9/27/2011    Pneumococcal Vaccine (UF)Historical Data 11/27/2009    TD Vaccine 10/26/2011  3:44 PM      Below  and/or attached are the medications your provider expects you to take. Review all of your home medications and newly ordered medications with your provider and/or pharmacist. Follow medication instructions as directed by your provider and/or pharmacist. Please keep your medication list with you and share with your provider. Update the information when medications are discontinued, doses are changed, or new medications (including over-the-counter products) are added; and carry medication information at all times in the event of emergency situations     Allergies:  NKDA - (reactions not documented)               Medications  Valid as of: July 19, 2017 - 11:39 AM    Generic Name Brand Name Tablet Size Instructions for use    Allopurinol (Tab) ZYLOPRIM 100 MG Take 1 Tab by mouth every day. TAKE 1 TAB BY MOUTH EVERY DAY.        AmLODIPine Besylate (Tab) NORVASC 5 MG Take 1 Tab by mouth every day.        Azithromycin (Tab) ZITHROMAX 250 MG 2 tablets Day 1, then 1 tablet a day        Carvedilol Phosphate (CAPSULE SR 24 HR) COREG CR 20 MG Take 1 Cap by mouth every day.        Cholecalciferol (Tab) cholecalciferol 1000 UNIT Take 1,000 Units by mouth every day.        Ferrous Sulfate (Tab) Iron 325 (65 FE) MG Take 1 Tab by mouth every day.        Furosemide (Tab) LASIX 40 MG Take 1 Tab by mouth every day.        Glucose Blood (Strip) glucose blood  1 Strip by Other route BID 2 DAYS A WEEK.        Insulin Detemir (Solution Pen-injector) LEVEMIR FLEXPEN 100 UNIT/ML INJECT 25 UNITS AS DIRECTED AT BEDTIME        Nateglinide (Tab) STARLIX 120 MG Take 1 Tab by mouth 2 Times a Day.        Niacin (Tab) niacin 500 MG Take 500 mg by mouth every day.        Pioglitazone HCl (Tab) ACTOS 30 MG Take 1 Tab by mouth every day.        Pseudoephedrine HCl   Take  by mouth as needed.          Rosuvastatin Calcium (Tab) CRESTOR 10 MG Take 1 Tab by mouth every day.        Warfarin Sodium (Tab) COUMADIN 3 MG Take 0.5 Tabs by mouth every day.  Followed by Dr. Palomino        .                 Medicines prescribed today were sent to:     [id number] - [place], [place] - [address]    [address] [place] [address]    Phone: [phone] Fax: [phone]    Open 24 Hours?: No      Medication refill instructions:       If your prescription bottle indicates you have medication refills left, it is not necessary to call your provider’s office. Please contact your pharmacy and they will refill your medication.    If your prescription bottle indicates you do not have any refills left, you may request refills at any time through one of the following ways: The online PicApp system (except Urgent Care), by calling your provider’s office, or by asking your pharmacy to contact your provider’s office with a refill request. Medication refills are processed only during regular business hours and may not be available until the next business day. Your provider may request additional information or to have a follow-up visit with you prior to refilling your medication.   *Please Note: Medication refills are assigned a new Rx number when refilled electronically. Your pharmacy may indicate that no refills were authorized even though a new prescription for the same medication is available at the pharmacy. Please request the medicine by name with the pharmacy before contacting your provider for a refill.        Your To Do List     Future Labs/Procedures Complete By Expires    HEMOGLOBIN A1C  As directed 1/18/2018    OCCULT BLOOD FECES IMMUNOASSAY (FIT)  As directed 7/19/2018         PicApp Access Code: Activation code not generated  Current PicApp Status: Active

## 2017-07-19 NOTE — PROGRESS NOTES
CC: Multiple medical issues( prostate cancer, HTN, HLD, pacemaker)    HPI:   Werner presents today to discuss the following issues    Type 2 diabetes mellitus, has been adequately controlled. Last A1C was 6.1. .Denies excessive urination, and drinking of water, no symptoms of low blood sugar ( dizziness, and sweating). Currently on Livemere insulin 19 units daily, and Actos 30 mg daily, and Starlix 120 mg bid.No side effects.     Essential hypertension, BP has been adequately controlled on current medication. Denies headache, chest pain, and SOB.Currently on Amlodipine 5 mg , Carvedilol 25 mg daily.No side effects.    Has a cardiac pacemaker in situ, for SSS. Has been stable, and asymptomatic.He follows up with cardiology Dr Prajapati  for pacemaker check up    Dyslipidemia, he has been tolerating the statin. Denies muscle pain LFTs has been normal, has been  on the crestor 5 mg daily.    Patient has h/o prostate cancer , has had a recent biopsy showed recurrence. Patient has appt with urology for radiation implants. Currently denies any blood in the urine or bone pain.    Due for FIT test.      Patient Active Problem List    Diagnosis Date Noted   • Obesity (BMI 30-39.9) 01/18/2017   • Dyslipidemia 05/30/2016   • Paroxysmal atrial fibrillation (CMS-HCC) 05/07/2015   • History of DVT (deep vein thrombosis) 08/25/2014   • H/O vitamin D deficiency 08/25/2014   • Hyperuricemia 08/25/2014   • Prostate cancer (CMS-HCC) 08/25/2014   • SSS (sick sinus syndrome) (CMS-HCC) 09/19/2012   • Cardiac pacemaker in situ 09/01/2011   • Chronic lymphocytic leukemia (CMS-HCC) 09/01/2011   • Essential hypertension 09/01/2011   • CKD (chronic kidney disease) stage 3, GFR 30-59 ml/min 09/01/2011   • Diabetes mellitus type 2 in nonobese (CMS-HCC) 09/01/2011       Current Outpatient Prescriptions   Medication Sig Dispense Refill   • amlodipine (NORVASC) 5 MG Tab Take 1 Tab by mouth every day. 90 Tab 1   • allopurinol (ZYLOPRIM) 100 MG Tab  "Take 1 Tab by mouth every day. TAKE 1 TAB BY MOUTH EVERY DAY. 90 Tab 3   • nateglinide (STARLIX) 120 MG Tab Take 1 Tab by mouth 2 Times a Day. 60 Tab 1   • vitamin D (CHOLECALCIFEROL) 1000 UNIT Tab Take 1,000 Units by mouth every day.     • pioglitazone (ACTOS) 30 MG Tab Take 1 Tab by mouth every day. 90 Tab 3   • furosemide (LASIX) 40 MG Tab Take 1 Tab by mouth every day. 90 Tab 3   • Carvedilol Phosphate (COREG CR) 20 MG CAPSULE SR 24 HR Take 1 Cap by mouth every day. 90 Cap 2   • glucose blood (ONE TOUCH ULTRA TEST) strip 1 Strip by Other route BID 2 DAYS A WEEK. 100 Strip 3   • rosuvastatin (CRESTOR) 10 MG Tab Take 1 Tab by mouth every day. 90 Tab 3   • LEVEMIR FLEXPEN 100 UNIT/ML Solution Pen-injector injection INJECT 25 UNITS AS DIRECTED AT BEDTIME (Patient taking differently: INJECT 19 UNITS AS DIRECTED AT BEDTIME) 45 PEN 1   • Pseudoephedrine HCl (SUDAFED 12 HOUR PO) Take  by mouth as needed.       • warfarin (COUMADIN) 3 MG TABS Take 0.5 Tabs by mouth every day. Followed by Dr. Palomino 1 Each 0   • Ferrous Sulfate (IRON) 325 (65 FE) MG TABS Take 1 Tab by mouth every day.     • niacin 500 MG TABS Take 500 mg by mouth every day.     • azithromycin (ZITHROMAX) 250 MG Tab 2 tablets Day 1, then 1 tablet a day 6 Tab 0     No current facility-administered medications for this visit.         Allergies as of 07/19/2017 - Fernie as Reviewed 07/19/2017   Allergen Reaction Noted   • Nkda [no known drug allergy]  02/15/2008        ROS: Denies any chest pain, Shortness of breath, Changes bowel or bladder, Lower extremity edema.    Physical Exam:  /70 mmHg  Pulse 64  Temp(Src) 36.8 °C (98.2 °F)  Resp 14  Ht 1.753 m (5' 9\")  Wt 96.6 kg (212 lb 15.4 oz)  BMI 31.43 kg/m2  SpO2 94%  Gen.: Well-developed, well-nourished, no apparent distress,pleasant and cooperative with the examination  Skin:  Warm and dry with good turgor. No rashes or suspicious lesions in visible areas  HEENT:Sinuses nontender with palpation, TMs " clear, nares patent with pink mucosa and clear rhinorrhea,no septal deviation ,polyps or lesions. lips without lesions, oropharynx clear.  Neck: Trachea midline,no masses or adenopathy. No JVD.  Cor: Regular rate and rhythm without murmur, gallop or rub.  Lungs: Respirations unlabored.Clear to auscultation with equal breath sounds bilaterally. No wheezes, rhonchi.  Extremities: No cyanosis, clubbing or edema.      Assessment and Plan.   83 y.o. male     1. Controlled type 2 diabetes mellitus with complication, with long-term current use of insulin (CMS-HCC)  Adequately controlled. Last A1C was 5.9.  Continue on Livemere insulin 19 units daily, and Actos 30 mg daily, and Starlix 120 mg bid.    - HEMOGLOBIN A1C; Future  - POCT Retinal Eye Exam    2. Essential hypertension  Has been adequately controlled on current medication. Denies headache, chest pain, and SOB.  Continue on Amlodipine 5 mg , Carvedilol 25 mg daily.    3. Dyslipidemia  He has been tolerating the statin. Denies muscle pain LFTs has been normal  Continue on the crestor 10 mg daily.    4. Cardiac pacemaker in situ  Stable, asymptomatic.  Continue follow up with cardiology Dr Prajapati  for pacemaker check up    5. Prostate cancer (CMS-HCC)  Biopsy showed cnacer  Scheduled for radiation therapy.    6. Health care maintenance  Due for FIT test.    - OCCULT BLOOD FECES IMMUNOASSAY (FIT); Future

## 2017-07-21 ENCOUNTER — HOSPITAL ENCOUNTER (OUTPATIENT)
Facility: MEDICAL CENTER | Age: 82
End: 2017-07-21
Attending: FAMILY MEDICINE
Payer: COMMERCIAL

## 2017-07-21 PROCEDURE — 82274 ASSAY TEST FOR BLOOD FECAL: CPT

## 2017-07-24 ENCOUNTER — ANTICOAGULATION VISIT (OUTPATIENT)
Dept: VASCULAR LAB | Facility: MEDICAL CENTER | Age: 82
End: 2017-07-24
Attending: INTERNAL MEDICINE
Payer: COMMERCIAL

## 2017-07-24 VITALS — SYSTOLIC BLOOD PRESSURE: 115 MMHG | DIASTOLIC BLOOD PRESSURE: 69 MMHG | HEART RATE: 56 BPM

## 2017-07-24 DIAGNOSIS — Z86.718 HISTORY OF DVT (DEEP VEIN THROMBOSIS): ICD-10-CM

## 2017-07-24 DIAGNOSIS — I48.0 PAROXYSMAL ATRIAL FIBRILLATION (HCC): ICD-10-CM

## 2017-07-24 LAB — INR PPP: 1.7 (ref 2–3.5)

## 2017-07-24 PROCEDURE — 99212 OFFICE O/P EST SF 10 MIN: CPT | Performed by: NURSE PRACTITIONER

## 2017-07-24 PROCEDURE — 85610 PROTHROMBIN TIME: CPT

## 2017-07-24 NOTE — PROGRESS NOTES
Anticoagulation Summary as of 7/24/2017     INR goal 2.0-3.0   Selected INR 1.7! (7/24/2017)   Maintenance plan 4.5 mg (3 mg x 1.5) on Mon; 3 mg (3 mg x 1) all other days   Weekly total 22.5 mg   Plan last modified Aletha Junior, PHARMD (10/1/2015)   Next INR check 8/7/2017   Target end date     Indications   Paroxysmal atrial fibrillation (CMS-HCC) [I48.0]  History of DVT (deep vein thrombosis) [Z86.718]         Anticoagulation Episode Summary     INR check location [facility]    Preferred lab [facility]    Send INR reminders to     Comments Fax progress note to Dr. Prajapati [phone]      Anticoagulation Care Providers     Provider Role Specialty Phone number    Tito Prajapati M.D. Referring Cardiology [phone]    [facility] Responsible  [phone]        Anticoagulation Patient Findings      Current Outpatient Prescriptions on File Prior to Visit   Medication Sig Dispense Refill   • amlodipine (NORVASC) 5 MG Tab Take 1 Tab by mouth every day. 90 Tab 1   • allopurinol (ZYLOPRIM) 100 MG Tab Take 1 Tab by mouth every day. TAKE 1 TAB BY MOUTH EVERY DAY. 90 Tab 3   • nateglinide (STARLIX) 120 MG Tab Take 1 Tab by mouth 2 Times a Day. 60 Tab 1   • vitamin D (CHOLECALCIFEROL) 1000 UNIT Tab Take 1,000 Units by mouth every day.     • pioglitazone (ACTOS) 30 MG Tab Take 1 Tab by mouth every day. 90 Tab 3   • furosemide (LASIX) 40 MG Tab Take 1 Tab by mouth every day. 90 Tab 3   • Carvedilol Phosphate (COREG CR) 20 MG CAPSULE SR 24 HR Take 1 Cap by mouth every day. 90 Cap 2   • azithromycin (ZITHROMAX) 250 MG Tab 2 tablets Day 1, then 1 tablet a day 6 Tab 0   • glucose blood (ONE TOUCH ULTRA TEST) strip 1 Strip by Other route BID 2 DAYS A WEEK. 100 Strip 3   • rosuvastatin (CRESTOR) 10 MG Tab Take 1 Tab by mouth every day. 90 Tab 3   • LEVEMIR FLEXPEN 100 UNIT/ML Solution Pen-injector injection INJECT 25 UNITS AS DIRECTED AT BEDTIME (Patient taking differently: INJECT 19 UNITS AS  DIRECTED AT BEDTIME) 45 PEN 1   • Pseudoephedrine HCl (SUDAFED 12 HOUR PO) Take  by mouth as needed.       • warfarin (COUMADIN) 3 MG TABS Take 0.5 Tabs by mouth every day. Followed by Dr. Palomino 1 Each 0   • Ferrous Sulfate (IRON) 325 (65 FE) MG TABS Take 1 Tab by mouth every day.     • niacin 500 MG TABS Take 500 mg by mouth every day.       No current facility-administered medications on file prior to visit.       Lab Results   Component Value Date/Time    SODIUM 142 12/01/2016 11:06 AM    POTASSIUM 4.3 12/01/2016 11:06 AM    CHLORIDE 105 12/01/2016 11:06 AM    CO2 27 12/01/2016 11:06 AM    GLUCOSE 101* 12/01/2016 11:06 AM    BUN 35* 12/01/2016 11:06 AM    CREATININE 1.91* 12/01/2016 11:06 AM    BUN-CREATININE RATIO 17 02/26/2015 06:55 AM          Werner Acuña seen in clinic today  INR  SUB-therapeutic.    Denies signs/symptoms of bleeding and/or thrombosis.    Denies changes to diet or medications. He was off for a procedure. Increase dose tonight to 6 mg one time only then continue usual dose.    Follow up appointment in 2 week(s).           JOELLE Silva.   SARAN Muniz

## 2017-07-24 NOTE — MR AVS SNAPSHOT
Werner Acuña   2017 8:15 AM   Anticoagulation Visit   MRN: [id number]    Department:  Vascular Medicine   Dept Phone:  [phone]    Description:  Male : 1933   Provider:  V EXAM 4           Allergies as of 2017     Allergen Noted Reactions    Nkda [No Known Drug Allergy] 02/15/2008         You were diagnosed with     Paroxysmal atrial fibrillation (CMS-HCC)   [070962]       History of DVT (deep vein thrombosis)   [071093]         Vital Signs     Blood Pressure Pulse Smoking Status             115/69 mmHg 56 Never Smoker          Basic Information     Date Of Birth Sex Race Ethnicity Preferred Language    1933 Male White Non- English      Your appointments     2017  8:15 AM   Established Patient with IHV EXAM 4   [facility]  (--)    [address] [facility] [address]   [phone]            Aug 07, 2017  8:00 AM   Established Patient with IHV EXAM 4   [facility]  (--)    [address] [facility] [place] [address]   [phone]            Oct 25, 2017 11:40 AM   Established Patient with Kip Dyson M.D.   [facility] [address]    [address]  [address]  [facility] [address]   [phone]           You will be receiving a confirmation call a few days before your appointment from our automated call confirmation system.              Problem List              ICD-10-CM Priority Class Noted - Resolved    Cardiac pacemaker in situ Z95.0   2011 - Present    Chronic lymphocytic leukemia (CMS-HCC) C91.10   2011 - Present    Essential hypertension I10   2011 - Present    CKD (chronic kidney disease) stage 3, GFR 30-59 ml/min N18.3   2011 - Present    Diabetes mellitus type 2 in nonobese (CMS-HCC) E11.9   2011 - Present    SSS (sick sinus syndrome) (CMS-HCC) I49.5   2012 - Present    History of DVT  (deep vein thrombosis) Z86.718   8/25/2014 - Present    H/O vitamin D deficiency Z86.39   8/25/2014 - Present    Hyperuricemia E79.0   8/25/2014 - Present    Prostate cancer (CMS-HCC) C61   8/25/2014 - Present    Paroxysmal atrial fibrillation (CMS-HCC) I48.0   5/7/2015 - Present    Dyslipidemia E78.5   5/30/2016 - Present    Obesity (BMI 30-39.9) E66.9   1/18/2017 - Present      Health Maintenance        Date Due Completion Dates    IMM ZOSTER VACCINE 9/22/1993 ---    IMM DTaP/Tdap/Td Vaccine (1 - Tdap) 10/27/2011 10/26/2011    IMM PNEUMOCOCCAL 65+ (ADULT) HIGH/HIGHEST RISK SERIES (2 of 2 - PPSV23) 1/27/2016 12/2/2015    A1C SCREENING 7/19/2017 1/19/2017, 2/26/2015, 10/26/2011, 2/4/2007    IMM INFLUENZA (1) 9/1/2017 9/15/2016, 9/15/2015, 9/23/2013, 9/27/2011    FASTING LIPID PROFILE 11/25/2017 11/25/2016, 2/26/2015, 3/6/2012, 10/27/2011    SERUM CREATININE 12/1/2017 12/1/2016, 2/26/2015, 3/6/2012, 10/28/2011, 10/27/2011, 10/26/2011, 1/21/2011, 2/16/2008, 2/15/2008, 2/8/2007, 2/7/2007, 2/6/2007, 2/6/2007, 2/6/2007, 2/6/2007, 2/5/2007, 2/5/2007, 2/5/2007, 2/4/2007, 2/4/2007, 2/4/2007    RETINAL SCREENING 12/13/2017 12/13/2016 (Done), 2/15/2014 (Done)    Override on 12/13/2016: Done    Override on 2/15/2014: Done    DIABETES MONOFILAMENT / LE EXAM 1/18/2018 1/18/2017, 8/6/2014 (Done)    Override on 8/6/2014: Done    URINE ACR / MICROALBUMIN 1/19/2018 1/19/2017            Results     POCT Protime      Component    INR    1.7                        Current Immunizations     13-VALENT PCV PREVNAR 12/2/2015    Influenza Vaccine Adult HD 9/15/2016, 9/15/2015, 9/23/2013    Influenza Vaccine Quad Inj (Preserved) 9/27/2011    Pneumococcal Vaccine (UF)Historical Data 11/27/2009    TD Vaccine 10/26/2011  3:44 PM      Below and/or attached are the medications your provider expects you to take. Review all of your home medications and newly ordered medications with your provider and/or pharmacist. Follow medication instructions as  directed by your provider and/or pharmacist. Please keep your medication list with you and share with your provider. Update the information when medications are discontinued, doses are changed, or new medications (including over-the-counter products) are added; and carry medication information at all times in the event of emergency situations     Allergies:  NKDA - (reactions not documented)               Medications  Valid as of: July 24, 2017 -  8:01 AM    Generic Name Brand Name Tablet Size Instructions for use    Allopurinol (Tab) ZYLOPRIM 100 MG Take 1 Tab by mouth every day. TAKE 1 TAB BY MOUTH EVERY DAY.        AmLODIPine Besylate (Tab) NORVASC 5 MG Take 1 Tab by mouth every day.        Azithromycin (Tab) ZITHROMAX 250 MG 2 tablets Day 1, then 1 tablet a day        Carvedilol Phosphate (CAPSULE SR 24 HR) COREG CR 20 MG Take 1 Cap by mouth every day.        Cholecalciferol (Tab) cholecalciferol 1000 UNIT Take 1,000 Units by mouth every day.        Ferrous Sulfate (Tab) Iron 325 (65 FE) MG Take 1 Tab by mouth every day.        Furosemide (Tab) LASIX 40 MG Take 1 Tab by mouth every day.        Glucose Blood (Strip) glucose blood  1 Strip by Other route BID 2 DAYS A WEEK.        Insulin Detemir (Solution Pen-injector) LEVEMIR FLEXPEN 100 UNIT/ML INJECT 25 UNITS AS DIRECTED AT BEDTIME        Nateglinide (Tab) STARLIX 120 MG Take 1 Tab by mouth 2 Times a Day.        Niacin (Tab) niacin 500 MG Take 500 mg by mouth every day.        Pioglitazone HCl (Tab) ACTOS 30 MG Take 1 Tab by mouth every day.        Pseudoephedrine HCl   Take  by mouth as needed.          Rosuvastatin Calcium (Tab) CRESTOR 10 MG Take 1 Tab by mouth every day.        Warfarin Sodium (Tab) COUMADIN 3 MG Take 0.5 Tabs by mouth every day. Followed by Dr. Palomino        .                 Medicines prescribed today were sent to:     [facility]/PHARMACY #[id number] - [place] - [address]    [address] [place] [address] [place] [address]    Phone: [phone] Fax:  [phone]    Open 24 Hours?: No      Medication refill instructions:       If your prescription bottle indicates you have medication refills left, it is not necessary to call your provider’s office. Please contact your pharmacy and they will refill your medication.    If your prescription bottle indicates you do not have any refills left, you may request refills at any time through one of the following ways: The online [employer] system (except Urgent Care), by calling your provider’s office, or by asking your pharmacy to contact your provider’s office with a refill request. Medication refills are processed only during regular business hours and may not be available until the next business day. Your provider may request additional information or to have a follow-up visit with you prior to refilling your medication.   *Please Note: Medication refills are assigned a new Rx number when refilled electronically. Your pharmacy may indicate that no refills were authorized even though a new prescription for the same medication is available at the pharmacy. Please request the medicine by name with the pharmacy before contacting your provider for a refill.        Warfarin Dosing Calendar   July 2017 Details    Sun Mon Tue Wed Thu Fri Sat           1                 2               3               4               5               6               7               8                 9               10               11               12               13               14               15                 16               17               18               19               20               21               22                 23               24   1.7   6 mg   See details      25      3 mg         26      3 mg         27      3 mg         28      3 mg         29      3 mg           30      3 mg         31      4.5 mg               Date Details   07/24 This INR check   INR: 1.7               How to take your warfarin dose     To take:  3  mg Take 1 of the 3 mg tablets.    To take:  4.5 mg Take 1.5 of the 3 mg tablets.    To take:  6 mg Take 2 of the 3 mg tablets.           Warfarin Dosing Calendar   August 2017 Details    Sun Mon Tue Wed Thu Fri Sat       1      3 mg         2      3 mg         3      3 mg         4      3 mg         5      3 mg           6      3 mg         7      4.5 mg         8               9               10               11               12                 13               14               15               16               17               18               19                 20               21               22               23               24               25               26                 27               28               29               30               31                  Date Details   No additional details    Date of next INR:  8/7/2017         How to take your warfarin dose     To take:  3 mg Take 1 of the 3 mg tablets.    To take:  4.5 mg Take 1.5 of the 3 mg tablets.              [employer] Access Code: Activation code not generated  Current [employer] Status: Active

## 2017-07-26 DIAGNOSIS — Z00.00 HEALTH CARE MAINTENANCE: ICD-10-CM

## 2017-07-26 LAB — HEMOCCULT STL QL IA: NEGATIVE

## 2017-07-27 LAB — INR BLD: 1.7 (ref 0.9–1.2)

## 2017-07-28 RX ORDER — NATEGLINIDE 120 MG/1
TABLET ORAL
Qty: 60 TAB | Refills: 1 | Status: SHIPPED | OUTPATIENT
Start: 2017-07-28 | End: 2017-09-18 | Stop reason: SDUPTHER

## 2017-08-07 ENCOUNTER — ANTICOAGULATION VISIT (OUTPATIENT)
Dept: VASCULAR LAB | Facility: MEDICAL CENTER | Age: 82
End: 2017-08-07
Attending: INTERNAL MEDICINE
Payer: COMMERCIAL

## 2017-08-07 VITALS — SYSTOLIC BLOOD PRESSURE: 134 MMHG | HEART RATE: 60 BPM | DIASTOLIC BLOOD PRESSURE: 82 MMHG

## 2017-08-07 DIAGNOSIS — I48.0 PAROXYSMAL ATRIAL FIBRILLATION (HCC): ICD-10-CM

## 2017-08-07 DIAGNOSIS — Z86.718 HISTORY OF DVT (DEEP VEIN THROMBOSIS): ICD-10-CM

## 2017-08-07 LAB
INR BLD: 1.5 (ref 0.9–1.2)
INR PPP: 1.5 (ref 2–3.5)

## 2017-08-07 PROCEDURE — 85610 PROTHROMBIN TIME: CPT

## 2017-08-07 PROCEDURE — 99212 OFFICE O/P EST SF 10 MIN: CPT | Performed by: PHARMACIST

## 2017-08-07 NOTE — MR AVS SNAPSHOT
Werner Acuña   2017 8:00 AM   Anticoagulation Visit   MRN: [id number]    Department:  Vascular Medicine   Dept Phone:  [phone]    Description:  Male : 1933   Provider:  FABIANA EXAM 4           Allergies as of 2017     Allergen Noted Reactions    Nkda [No Known Drug Allergy] 02/15/2008         You were diagnosed with     Paroxysmal atrial fibrillation (CMS-HCC)   [396306]       History of DVT (deep vein thrombosis)   [190111]         Vital Signs     Blood Pressure Pulse Smoking Status             134/82 mmHg 60 Never Smoker          Basic Information     Date Of Birth Sex Race Ethnicity Preferred Language    1933 Male White Non- English      Your appointments     Oct 25, 2017 11:40 AM   Established Patient with Kip Dyson M.D.   [facility] [address]    [address]  [address]  [place] [address] [phone]           You will be receiving a confirmation call a few days before your appointment from our automated call confirmation system.              Problem List              ICD-10-CM Priority Class Noted - Resolved    Cardiac pacemaker in situ Z95.0   2011 - Present    Chronic lymphocytic leukemia (CMS-HCC) C91.10   2011 - Present    Essential hypertension I10   2011 - Present    CKD (chronic kidney disease) stage 3, GFR 30-59 ml/min N18.3   2011 - Present    Diabetes mellitus type 2 in nonobese (CMS-HCC) E11.9   2011 - Present    SSS (sick sinus syndrome) (CMS-HCC) I49.5   2012 - Present    History of DVT (deep vein thrombosis) Z86.718   2014 - Present    H/O vitamin D deficiency Z86.39   2014 - Present    Hyperuricemia E79.0   2014 - Present    Prostate cancer (CMS-HCC) C61   2014 - Present    Paroxysmal atrial fibrillation (CMS-HCC) I48.0   2015 - Present    Dyslipidemia E78.5   2016 - Present    Obesity (BMI 30-39.9) E66.9   2017 - Present      Health Maintenance        Date Due Completion Dates    IMM ZOSTER VACCINE 9/22/1993 ---    IMM DTaP/Tdap/Td Vaccine (1 - Tdap) 10/27/2011 10/26/2011    IMM PNEUMOCOCCAL 65+ (ADULT) HIGH/HIGHEST RISK SERIES (2 of 2 - PPSV23) 1/27/2016 12/2/2015    A1C SCREENING 7/19/2017 1/19/2017, 2/26/2015, 10/26/2011, 2/4/2007    IMM INFLUENZA (1) 9/1/2017 9/15/2016, 9/15/2015, 9/23/2013, 9/27/2011    FASTING LIPID PROFILE 11/25/2017 11/25/2016, 2/26/2015, 3/6/2012, 10/27/2011    SERUM CREATININE 12/1/2017 12/1/2016, 2/26/2015, 3/6/2012, 10/28/2011, 10/27/2011, 10/26/2011, 1/21/2011, 2/16/2008, 2/15/2008, 2/8/2007, 2/7/2007, 2/6/2007, 2/6/2007, 2/6/2007, 2/6/2007, 2/5/2007, 2/5/2007, 2/5/2007, 2/4/2007, 2/4/2007, 2/4/2007    RETINAL SCREENING 12/13/2017 12/13/2016 (Done), 2/15/2014 (Done)    Override on 12/13/2016: Done    Override on 2/15/2014: Done    DIABETES MONOFILAMENT / LE EXAM 1/18/2018 1/18/2017, 8/6/2014 (Done)    Override on 8/6/2014: Done    URINE ACR / MICROALBUMIN 1/19/2018 1/19/2017            Results     POCT Protime      Component    INR    1.5                        Current Immunizations     13-VALENT PCV PREVNAR 12/2/2015    Influenza Vaccine Adult HD 9/15/2016, 9/15/2015, 9/23/2013    Influenza Vaccine Quad Inj (Preserved) 9/27/2011    Pneumococcal Vaccine (UF)Historical Data 11/27/2009    TD Vaccine 10/26/2011  3:44 PM      Below and/or attached are the medications your provider expects you to take. Review all of your home medications and newly ordered medications with your provider and/or pharmacist. Follow medication instructions as directed by your provider and/or pharmacist. Please keep your medication list with you and share with your provider. Update the information when medications are discontinued, doses are changed, or new medications (including over-the-counter products) are added; and carry medication information at all times in the event of emergency situations     Allergies:  NKDA - (reactions not documented)                  Medications  Valid as of: August 07, 2017 -  8:03 AM    Generic Name Brand Name Tablet Size Instructions for use    Allopurinol (Tab) ZYLOPRIM 100 MG Take 1 Tab by mouth every day. TAKE 1 TAB BY MOUTH EVERY DAY.        AmLODIPine Besylate (Tab) NORVASC 5 MG Take 1 Tab by mouth every day.        Azithromycin (Tab) ZITHROMAX 250 MG 2 tablets Day 1, then 1 tablet a day        Carvedilol Phosphate (CAPSULE SR 24 HR) COREG CR 20 MG Take 1 Cap by mouth every day.        Cholecalciferol (Tab) cholecalciferol 1000 UNIT Take 1,000 Units by mouth every day.        Ferrous Sulfate (Tab) Iron 325 (65 FE) MG Take 1 Tab by mouth every day.        Furosemide (Tab) LASIX 40 MG Take 1 Tab by mouth every day.        Glucose Blood (Strip) glucose blood  1 Strip by Other route BID 2 DAYS A WEEK.        Insulin Detemir (Solution Pen-injector) LEVEMIR FLEXPEN 100 UNIT/ML INJECT 25 UNITS AS DIRECTED AT BEDTIME        Nateglinide (Tab) STARLIX 120 MG TAKE 1 TABLET BY MOUTH TWICE A DAY        Niacin (Tab) niacin 500 MG Take 500 mg by mouth every day.        Pioglitazone HCl (Tab) ACTOS 30 MG Take 1 Tab by mouth every day.        Pseudoephedrine HCl   Take  by mouth as needed.          Rosuvastatin Calcium (Tab) CRESTOR 10 MG Take 1 Tab by mouth every day.        Warfarin Sodium (Tab) COUMADIN 3 MG Take 0.5 Tabs by mouth every day. Followed by Dr. Palomino        .                 Medicines prescribed today were sent to:     [facility]ACY #[id number] - [place], [place] - [address] [place] [address]    Phone: [phone] Fax: [phone]    Open 24 Hours?: No      Medication refill instructions:       If your prescription bottle indicates you have medication refills left, it is not necessary to call your provider’s office. Please contact your pharmacy and they will refill your medication.    If your prescription bottle indicates you do not have any refills left, you may request refills at any time through one of the  following ways: The online [employer] system (except Urgent Care), by calling your provider’s office, or by asking your pharmacy to contact your provider’s office with a refill request. Medication refills are processed only during regular business hours and may not be available until the next business day. Your provider may request additional information or to have a follow-up visit with you prior to refilling your medication.   *Please Note: Medication refills are assigned a new Rx number when refilled electronically. Your pharmacy may indicate that no refills were authorized even though a new prescription for the same medication is available at the pharmacy. Please request the medicine by name with the pharmacy before contacting your provider for a refill.        Warfarin Dosing Calendar   August 2017 Details    Sun Mon Tue Wed Thu Fri Sat       1               2               3               4               5                 6               7   1.5   6 mg   See details      8      3 mg         9      3 mg         10      3 mg         11      4.5 mg         12      3 mg           13      3 mg         14      4.5 mg         15      3 mg         16      3 mg         17      3 mg         18      4.5 mg         19      3 mg           20      3 mg         21      4.5 mg         22               23               24               25               26                 27               28               29               30               31                  Date Details   08/07 This INR check   INR: 1.5       Date of next INR:  8/21/2017         How to take your warfarin dose     To take:  3 mg Take 1 of the 3 mg tablets.    To take:  4.5 mg Take 1.5 of the 3 mg tablets.    To take:  6 mg Take 2 of the 3 mg tablets.              [employer] Access Code: Activation code not generated  Current [employer] Status: Active

## 2017-08-11 LAB — HBA1C MFR BLD: 5.7 % (ref 4.8–5.6)

## 2017-08-21 ENCOUNTER — ANTICOAGULATION VISIT (OUTPATIENT)
Dept: VASCULAR LAB | Facility: MEDICAL CENTER | Age: 82
End: 2017-08-21
Attending: INTERNAL MEDICINE
Payer: COMMERCIAL

## 2017-08-21 VITALS — SYSTOLIC BLOOD PRESSURE: 121 MMHG | DIASTOLIC BLOOD PRESSURE: 64 MMHG | HEART RATE: 57 BPM

## 2017-08-21 DIAGNOSIS — Z86.718 HISTORY OF DVT (DEEP VEIN THROMBOSIS): ICD-10-CM

## 2017-08-21 DIAGNOSIS — I48.0 PAROXYSMAL ATRIAL FIBRILLATION (HCC): ICD-10-CM

## 2017-08-21 LAB — INR PPP: 6 (ref 2–3.5)

## 2017-08-21 PROCEDURE — 99212 OFFICE O/P EST SF 10 MIN: CPT | Performed by: NURSE PRACTITIONER

## 2017-08-21 PROCEDURE — 85610 PROTHROMBIN TIME: CPT

## 2017-08-21 NOTE — PROGRESS NOTES
Anticoagulation Summary as of 8/21/2017     INR goal 2.0-3.0   Selected INR 6.0! (8/21/2017)   Maintenance plan 4.5 mg (3 mg x 1.5) on Mon; 3 mg (3 mg x 1) all other days   Weekly total 22.5 mg   Plan last modified ARNULFO Muñoz (8/21/2017)   Next INR check 9/6/2017   Target end date     Indications   Paroxysmal atrial fibrillation (CMS-HCC) [I48.0]  History of DVT (deep vein thrombosis) [Z86.718]         Anticoagulation Episode Summary     INR check location [facility]    Preferred lab [facility]    Send INR reminders to     Comments Fax progress note to Dr. Prajapati [phone]      Anticoagulation Care Providers     Provider Role Specialty Phone number    Tito Prajapati M.D. Referring Cardiology [phone]    [facility] Responsible  [phone]        Patient is supratherapeutic today. Denies any medication or diet changes and no cranberries or ETOH. Dose increased over the past couple of visits. No current symptoms of bleeding or thrombosis reported. Will HOLD two doses and decrease regimen by ~6%. Follow up in 2 weeks per pt's preference. Advised to avoid falls and seek medical attention for any prolonged bleeding > 20 min.    Next Appointment: Wednesday, September 6, 2017 at 8:00 am.     Azra NOONAN

## 2017-08-21 NOTE — MR AVS SNAPSHOT
Werner Acuña   2017 8:15 AM   Anticoagulation Visit   MRN: [id number]    Department:  Vascular Medicine   Dept Phone:  [phone]    Description:  Male : 1933   Provider:  [facility] EXAM 4           Allergies as of 2017     Allergen Noted Reactions    Nkda [No Known Drug Allergy] 02/15/2008         You were diagnosed with     Paroxysmal atrial fibrillation (CMS-HCC)   [492754]       History of DVT (deep vein thrombosis)   [800505]         Vital Signs     Blood Pressure Pulse Smoking Status             121/64 mmHg 57 Never Smoker          Basic Information     Date Of Birth Sex Race Ethnicity Preferred Language    1933 Male White Non- English      Your appointments     Aug 21, 2017  8:15 AM   Established Patient with [facility] EXAM 4   [facility]  (--)    [address] [address]   [phone]            Sep 06, 2017  8:00 AM   Established Patient with [facility] EXAM 4   [facility]  (--)    [address] [address]   [phone]            Oct 25, 2017 11:40 AM   Established Patient with Kip Dyson M.D.   [facility] [address]    [address]  [address]  [facility] [address]   [phone]           You will be receiving a confirmation call a few days before your appointment from our automated call confirmation system.              Problem List              ICD-10-CM Priority Class Noted - Resolved    Cardiac pacemaker in situ Z95.0   2011 - Present    Chronic lymphocytic leukemia (CMS-HCC) C91.10   2011 - Present    Essential hypertension I10   2011 - Present    CKD (chronic kidney disease) stage 3, GFR 30-59 ml/min N18.3   2011 - Present    Diabetes mellitus type 2 in nonobese (CMS-HCC) E11.9   2011 - Present    SSS (sick sinus syndrome) (CMS-HCC) I49.5   2012 - Present    History of DVT  (deep vein thrombosis) Z86.718   8/25/2014 - Present    H/O vitamin D deficiency Z86.39   8/25/2014 - Present    Hyperuricemia E79.0   8/25/2014 - Present    Prostate cancer (CMS-HCC) C61   8/25/2014 - Present    Paroxysmal atrial fibrillation (CMS-HCC) I48.0   5/7/2015 - Present    Dyslipidemia E78.5   5/30/2016 - Present    Obesity (BMI 30-39.9) E66.9   1/18/2017 - Present      Health Maintenance        Date Due Completion Dates    IMM ZOSTER VACCINE 9/22/1993 ---    IMM DTaP/Tdap/Td Vaccine (1 - Tdap) 10/27/2011 10/26/2011    IMM PNEUMOCOCCAL 65+ (ADULT) HIGH/HIGHEST RISK SERIES (2 of 2 - PPSV23) 1/27/2016 12/2/2015    IMM INFLUENZA (1) 9/1/2017 9/15/2016, 9/15/2015, 9/23/2013, 9/27/2011    FASTING LIPID PROFILE 11/25/2017 11/25/2016, 2/26/2015, 3/6/2012, 10/27/2011    SERUM CREATININE 12/1/2017 12/1/2016, 2/26/2015, 3/6/2012, 10/28/2011, 10/27/2011, 10/26/2011, 1/21/2011, 2/16/2008, 2/15/2008, 2/8/2007, 2/7/2007, 2/6/2007, 2/6/2007, 2/6/2007, 2/6/2007, 2/5/2007, 2/5/2007, 2/5/2007, 2/4/2007, 2/4/2007, 2/4/2007    RETINAL SCREENING 12/13/2017 12/13/2016 (Done), 2/15/2014 (Done)    Override on 12/13/2016: Done    Override on 2/15/2014: Done    DIABETES MONOFILAMENT / LE EXAM 1/18/2018 1/18/2017, 8/6/2014 (Done)    Override on 8/6/2014: Done    URINE ACR / MICROALBUMIN 1/19/2018 1/19/2017    A1C SCREENING 2/10/2018 8/10/2017, 1/19/2017, 2/26/2015, 10/26/2011, 2/4/2007            Results     POCT Protime      Component    INR    6.0                        Current Immunizations     13-VALENT PCV PREVNAR 12/2/2015    Influenza Vaccine Adult HD 9/15/2016, 9/15/2015, 9/23/2013    Influenza Vaccine Quad Inj (Preserved) 9/27/2011    Pneumococcal Vaccine (UF)Historical Data 11/27/2009    TD Vaccine 10/26/2011  3:44 PM      Below and/or attached are the medications your provider expects you to take. Review all of your home medications and newly ordered medications with your provider and/or pharmacist. Follow medication  instructions as directed by your provider and/or pharmacist. Please keep your medication list with you and share with your provider. Update the information when medications are discontinued, doses are changed, or new medications (including over-the-counter products) are added; and carry medication information at all times in the event of emergency situations     Allergies:  NKDA - (reactions not documented)               Medications  Valid as of: August 21, 2017 -  8:09 AM    Generic Name Brand Name Tablet Size Instructions for use    Allopurinol (Tab) ZYLOPRIM 100 MG Take 1 Tab by mouth every day. TAKE 1 TAB BY MOUTH EVERY DAY.        AmLODIPine Besylate (Tab) NORVASC 5 MG Take 1 Tab by mouth every day.        Azithromycin (Tab) ZITHROMAX 250 MG 2 tablets Day 1, then 1 tablet a day        Carvedilol Phosphate (CAPSULE SR 24 HR) COREG CR 20 MG Take 1 Cap by mouth every day.        Cholecalciferol (Tab) cholecalciferol 1000 UNIT Take 1,000 Units by mouth every day.        Ferrous Sulfate (Tab) Iron 325 (65 Fe) MG Take 1 Tab by mouth every day.        Furosemide (Tab) LASIX 40 MG Take 1 Tab by mouth every day.        Glucose Blood (Strip) glucose blood  1 Strip by Other route BID 2 DAYS A WEEK.        Insulin Detemir (Solution Pen-injector) LEVEMIR FLEXPEN 100 UNIT/ML INJECT 25 UNITS AS DIRECTED AT BEDTIME        Nateglinide (Tab) STARLIX 120 MG TAKE 1 TABLET BY MOUTH TWICE A DAY        Niacin (Tab) niacin 500 MG Take 500 mg by mouth every day.        Pioglitazone HCl (Tab) ACTOS 30 MG Take 1 Tab by mouth every day.        Pseudoephedrine HCl   Take  by mouth as needed.          Rosuvastatin Calcium (Tab) CRESTOR 10 MG Take 1 Tab by mouth every day.        Warfarin Sodium (Tab) COUMADIN 3 MG Take 0.5 Tabs by mouth every day. Followed by Dr. Palomino        .                 Medicines prescribed today were sent to:     [facility][id number] - [place], [place] - [address]    [address] [place] [address]    Phone:  [phone] Fax: [phone]    Open 24 Hours?: No      Medication refill instructions:       If your prescription bottle indicates you have medication refills left, it is not necessary to call your provider’s office. Please contact your pharmacy and they will refill your medication.    If your prescription bottle indicates you do not have any refills left, you may request refills at any time through one of the following ways: The online [employer] system (except Urgent Care), by calling your provider’s office, or by asking your pharmacy to contact your provider’s office with a refill request. Medication refills are processed only during regular business hours and may not be available until the next business day. Your provider may request additional information or to have a follow-up visit with you prior to refilling your medication.   *Please Note: Medication refills are assigned a new Rx number when refilled electronically. Your pharmacy may indicate that no refills were authorized even though a new prescription for the same medication is available at the pharmacy. Please request the medicine by name with the pharmacy before contacting your provider for a refill.        Warfarin Dosing Calendar   August 2017 Details    Sun Mon Tue Wed Thu Fri Sat       1               2               3               4               5                 6               7               8               9               10               11               12                 13               14               15               16               17               18               19                 20               21   6.0   Hold   See details      22      Hold         23      3 mg         24      3 mg         25      3 mg         26      3 mg           27      3 mg         28      4.5 mg         29      3 mg         30      3 mg         31      3 mg            Date Details   08/21 This INR check   INR: 6.0               How to take your warfarin  dose     To take:  3 mg Take 1 of the 3 mg tablets.    To take:  4.5 mg Take 1.5 of the 3 mg tablets.    Hold Do not take your warfarin dose. See the Details table to the right for additional instructions.                Warfarin Dosing Calendar   September 2017 Details    Sun Mon Tue Wed Thu Fri Sat          1      3 mg         2      3 mg           3      3 mg         4      4.5 mg         5      3 mg         6      3 mg         7               8               9                 10               11               12               13               14               15               16                 17               18               19               20               21               22               23                 24               25               26               27               28               29               30                Date Details   No additional details    Date of next INR:  9/6/2017         How to take your warfarin dose     To take:  3 mg Take 1 of the 3 mg tablets.    To take:  4.5 mg Take 1.5 of the 3 mg tablets.              [employer] Access Code: Activation code not generated  Current [employer] Status: Active

## 2017-08-24 LAB — INR BLD: 6 (ref 0.9–1.2)

## 2017-09-06 ENCOUNTER — ANTICOAGULATION VISIT (OUTPATIENT)
Dept: VASCULAR LAB | Facility: MEDICAL CENTER | Age: 82
End: 2017-09-06
Attending: INTERNAL MEDICINE
Payer: COMMERCIAL

## 2017-09-06 DIAGNOSIS — I48.0 PAROXYSMAL ATRIAL FIBRILLATION (HCC): ICD-10-CM

## 2017-09-06 DIAGNOSIS — Z86.718 HISTORY OF DVT (DEEP VEIN THROMBOSIS): ICD-10-CM

## 2017-09-06 LAB
INR BLD: 4.1 (ref 0.9–1.2)
INR PPP: 4.1 (ref 2–3.5)

## 2017-09-06 PROCEDURE — 85610 PROTHROMBIN TIME: CPT

## 2017-09-06 PROCEDURE — 99212 OFFICE O/P EST SF 10 MIN: CPT | Performed by: PHARMACIST

## 2017-09-06 NOTE — PROGRESS NOTES
Anticoagulation Summary  As of 9/6/2017    INR goal:   2.0-3.0   TTR:   69.8 % (2.3 y)   Today's INR:   4.1!   Maintenance plan:   3 mg (3 mg x 1) every day   Weekly total:   21 mg   Plan last modified:   Abdoul Lamar PharmD (9/6/2017)   Next INR check:   9/20/2017   Target end date:       Indications    Paroxysmal atrial fibrillation (CMS-HCC) [I48.0]  History of DVT (deep vein thrombosis) [Z86.718]             Anticoagulation Episode Summary     [facility] check location[facility]   [facility]    Preferred lab:   [facility]    Send INR reminders to:       Comments:   Fax progress note to Dr. Prajapati [phone]      Anticoagulation Care Providers     Provider Role Specialty Phone number    Tito Prajapati M.D. Referring Cardiology [phone]    [facility] Responsible  [phone]        Anticoagulation Patient Findings  Patient Findings     Negatives:   Signs/symptoms of thrombosis, Signs/symptoms of bleeding, Laboratory test error suspected, Change in health, Change in alcohol use, Change in activity, Upcoming invasive procedure, Emergency department visit, Upcoming dental procedure, Missed doses, Extra doses, Change in medications, Change in diet/appetite, Hospital admission, Bruising, Other complaints        Patient is supratherapeutic today with INR of 4.1.  Pt denies any unusual s/s of bleeding, bruising, clotting or any changes to diet or medications.  Pt is to continue with current warfarin dosing regimen.  Follow up in 3 weeks per patient preference.    Abdoul Lamar PharmD

## 2017-09-11 DIAGNOSIS — E78.6 HDL DEFICIENCY: ICD-10-CM

## 2017-09-11 RX ORDER — ROSUVASTATIN CALCIUM 10 MG/1
10 TABLET, COATED ORAL DAILY
Qty: 90 TAB | Refills: 3 | Status: SHIPPED | OUTPATIENT
Start: 2017-09-11 | End: 2018-09-22 | Stop reason: SDUPTHER

## 2017-09-18 RX ORDER — NATEGLINIDE 120 MG/1
TABLET ORAL
Qty: 60 TAB | Refills: 1 | Status: SHIPPED | OUTPATIENT
Start: 2017-09-18 | End: 2017-10-19 | Stop reason: SDUPTHER

## 2017-09-27 ENCOUNTER — ANTICOAGULATION VISIT (OUTPATIENT)
Dept: VASCULAR LAB | Facility: MEDICAL CENTER | Age: 82
End: 2017-09-27
Attending: INTERNAL MEDICINE
Payer: COMMERCIAL

## 2017-09-27 VITALS — DIASTOLIC BLOOD PRESSURE: 64 MMHG | HEART RATE: 58 BPM | SYSTOLIC BLOOD PRESSURE: 121 MMHG

## 2017-09-27 DIAGNOSIS — Z86.718 HISTORY OF DVT (DEEP VEIN THROMBOSIS): ICD-10-CM

## 2017-09-27 DIAGNOSIS — I48.0 PAROXYSMAL ATRIAL FIBRILLATION (HCC): ICD-10-CM

## 2017-09-27 LAB
INR BLD: 2.4 (ref 0.9–1.2)
INR PPP: 2.4 (ref 2–3.5)
INR PPP: 2.4 (ref 2–3.5)

## 2017-09-27 PROCEDURE — 85610 PROTHROMBIN TIME: CPT

## 2017-09-27 PROCEDURE — 99211 OFF/OP EST MAY X REQ PHY/QHP: CPT | Performed by: NURSE PRACTITIONER

## 2017-09-27 NOTE — PROGRESS NOTES
Anticoagulation Summary  As of 9/27/2017    INR goal:   2.0-3.0   TTR:   69.0 % (2.4 y)   Today's INR:   2.4   Maintenance plan:   3 mg (3 mg x 1) every day   Weekly total:   21 mg   Plan last modified:   Abdoul Lamar, PharmD (9/6/2017)   Next INR check:   10/11/2017   Target end date:       Indications    Paroxysmal atrial fibrillation (CMS-HCC) [I48.0]  History of DVT (deep vein thrombosis) [Z86.718]             Anticoagulation Episode Summary     [facility] location[facility]   [facility]    Preferred lab:   [facility]    Send INR reminders to:       Comments:   Fax progress note to Dr. Prajapati [phone]      Anticoagulation Care Providers     Provider Role Specialty Phone number    Tito Prajapati M.D. Referring Cardiology [phone]    [facility] Responsible  [phone]        Anticoagulation Patient Findings      HPI:  Werner Acuña seen in clinic today for follow up on anticoagulation therapy in the presence of AF. Denies any changes to current medical/health status since last appointment. Denies any medication or diet changes. No current symptoms of bleeding or thrombosis reported.    A/P:   INR therapeutic. Continue current regimen. BP recorded in vitals.    Follow up appointment in 2 week(s).    Next Appointment: Wednesday, October 11, 2017 at 8:00 am.    Azra NOONAN

## 2017-10-02 DIAGNOSIS — E08.00 DIABETES MELLITUS DUE TO UNDERLYING CONDITION WITH HYPEROSMOLARITY WITHOUT COMA, UNSPECIFIED LONG TERM INSULIN USE STATUS: ICD-10-CM

## 2017-10-11 ENCOUNTER — ANTICOAGULATION VISIT (OUTPATIENT)
Dept: VASCULAR LAB | Facility: MEDICAL CENTER | Age: 82
End: 2017-10-11
Attending: INTERNAL MEDICINE
Payer: COMMERCIAL

## 2017-10-11 DIAGNOSIS — Z86.718 HISTORY OF DVT (DEEP VEIN THROMBOSIS): ICD-10-CM

## 2017-10-11 DIAGNOSIS — I48.0 PAROXYSMAL ATRIAL FIBRILLATION (HCC): ICD-10-CM

## 2017-10-11 LAB
INR BLD: 1.8 (ref 0.9–1.2)
INR PPP: 1.8 (ref 2–3.5)

## 2017-10-11 PROCEDURE — 99212 OFFICE O/P EST SF 10 MIN: CPT | Performed by: PHARMACIST

## 2017-10-11 PROCEDURE — 85610 PROTHROMBIN TIME: CPT

## 2017-10-11 NOTE — PROGRESS NOTES
Anticoagulation Summary  As of 10/11/2017    INR goal:   2.0-3.0   TTR:   68.9 % (2.4 y)   Today's INR:   1.8!   Maintenance plan:   3 mg (3 mg x 1) every day   Weekly total:   21 mg   Plan last modified:   Abdoul Lamar, Osiris (9/6/2017)   Next INR check:   10/24/2017   Target end date:       Indications    Paroxysmal atrial fibrillation (CMS-HCC) [I48.0]  History of DVT (deep vein thrombosis) [Z86.718]             Anticoagulation Episode Summary     [facility]   [facility]    Preferred lab:   [facility]    Send INR reminders to:       Comments:   Fax progress note to Dr. Prajapati [phone]      Anticoagulation Care Providers     Provider Role Specialty Phone number    Tito Prajapati M.D. Referring Cardiology [phone]    [facility] Responsible  [phone]        Anticoagulation Patient Findings  Patient Findings     Negatives:   Signs/symptoms of thrombosis, Signs/symptoms of bleeding, Laboratory test error suspected, Change in health, Change in alcohol use, Change in activity, Upcoming invasive procedure, Emergency department visit, Upcoming dental procedure, Missed doses, Extra doses, Change in medications, Change in diet/appetite, Hospital admission, Bruising, Other complaints        HPI:   Werner Acuña seen in clinic today, on anticoagulation therapy with warfarin for hx of DVT  Changes to current medical/health status since last appt: NONE  Denies signs/symptoms of bleeding and/or thrombosis since the last appt.    Denies any interval changes to diet  Denies any interval changes to medications since last appt.   Denies any complications or cost restrictions with current therapy.      Vitals:  Patient declines vitals check today      Asssessment:  INR subtherapeutic at 1.8      Plan:  Instructed patient to bolus with 4.5mg X 1, then resume current warfarin regimen.     Follow up in 2 weeks.    Abdoul Lamar, JaredD

## 2017-10-19 RX ORDER — NATEGLINIDE 120 MG/1
TABLET ORAL
Qty: 180 TAB | Refills: 1 | Status: SHIPPED | OUTPATIENT
Start: 2017-10-19 | End: 2017-11-07 | Stop reason: SDUPTHER

## 2017-10-24 ENCOUNTER — APPOINTMENT (OUTPATIENT)
Dept: VASCULAR LAB | Facility: MEDICAL CENTER | Age: 82
End: 2017-10-24
Attending: INTERNAL MEDICINE
Payer: COMMERCIAL

## 2017-10-25 ENCOUNTER — OFFICE VISIT (OUTPATIENT)
Dept: MEDICAL GROUP | Facility: MEDICAL CENTER | Age: 82
End: 2017-10-25
Payer: COMMERCIAL

## 2017-10-25 ENCOUNTER — TELEPHONE (OUTPATIENT)
Dept: MEDICAL GROUP | Facility: MEDICAL CENTER | Age: 82
End: 2017-10-25

## 2017-10-25 VITALS
DIASTOLIC BLOOD PRESSURE: 64 MMHG | TEMPERATURE: 98.7 F | OXYGEN SATURATION: 93 % | HEIGHT: 70 IN | RESPIRATION RATE: 16 BRPM | WEIGHT: 198 LBS | SYSTOLIC BLOOD PRESSURE: 116 MMHG | BODY MASS INDEX: 28.35 KG/M2 | HEART RATE: 60 BPM

## 2017-10-25 DIAGNOSIS — Z79.4 TYPE 2 DIABETES MELLITUS WITHOUT COMPLICATION, WITH LONG-TERM CURRENT USE OF INSULIN (HCC): ICD-10-CM

## 2017-10-25 DIAGNOSIS — E78.5 DYSLIPIDEMIA: ICD-10-CM

## 2017-10-25 DIAGNOSIS — E11.9 TYPE 2 DIABETES MELLITUS WITHOUT COMPLICATION, WITH LONG-TERM CURRENT USE OF INSULIN (HCC): ICD-10-CM

## 2017-10-25 DIAGNOSIS — I48.0 PAROXYSMAL ATRIAL FIBRILLATION (HCC): ICD-10-CM

## 2017-10-25 DIAGNOSIS — I10 ESSENTIAL HYPERTENSION: ICD-10-CM

## 2017-10-25 PROCEDURE — 99214 OFFICE O/P EST MOD 30 MIN: CPT | Performed by: FAMILY MEDICINE

## 2017-10-25 NOTE — TELEPHONE ENCOUNTER
1. Caller Name: Werner Acuña                                         Call Back Number: [phone] (home)       Patient approves a detailed voicemail message: yes    Per Dr. Dyson he wants pt to Stop his Actos, LM for pt to stop his Actos and to call if he has questions.

## 2017-10-25 NOTE — PROGRESS NOTES
CC: Multiple medical issues.    HPI:   Werner presents today to discuss the following medica;l issues:    Type 2 diabetes mellitus, has been adequately controlled. His most recent A1C was 5.7( was 6.1 last visit). .Denies polyuria, polydipsia, and hypoglycemic episodes. he is currently on Livemere insulin 19 units daily, and Actos 30 mg daily, and Starlix 120 mg bid.     Essential hypertension, BP has been adequately controlled on current medication. Denies headache, chest pain, and SOB.Currently on Amlodipine 5 mg , Carvedilol 25 mg daily.No side effects.     Has h/o paroxysmal atrial fibrillation, has been asymptomatic.has normal rate and rhythm, however he has been on chronic anticoagulation therapy with coumadin. He follows up with the coumadin clinic.He laso hHs a cardiac pacemaker inserted for SSS. Has been stable, and asymptomatic.He follows up with cardiology Dr Prajapati  for pacemaker check up     Dyslipidemia, he has been tolerating the statin. Denies muscle pain LFTs has been normal, has been  on the crestor 5 mg daily.          Patient Active Problem List    Diagnosis Date Noted   • Obesity (BMI 30-39.9) 01/18/2017   • Dyslipidemia 05/30/2016   • Paroxysmal atrial fibrillation (CMS-HCC) 05/07/2015   • History of DVT (deep vein thrombosis) 08/25/2014   • H/O vitamin D deficiency 08/25/2014   • Hyperuricemia 08/25/2014   • Prostate cancer (CMS-HCC) 08/25/2014   • SSS (sick sinus syndrome) (CMS-HCC) 09/19/2012   • Cardiac pacemaker in situ 09/01/2011   • Chronic lymphocytic leukemia (CMS-HCC) 09/01/2011   • Essential hypertension 09/01/2011   • CKD (chronic kidney disease) stage 3, GFR 30-59 ml/min 09/01/2011   • Diabetes mellitus type 2 in nonobese (CMS-HCC) 09/01/2011       Current Outpatient Prescriptions   Medication Sig Dispense Refill   • nateglinide (STARLIX) 120 MG Tab TAKE 1 TABLET BY MOUTH TWICE A  Tab 1   • insulin detemir (LEVEMIR FLEXPEN) 100 UNIT/ML Solution Pen-injector injection INJECT  "25 UNITS AS DIRECTED AT BEDTIME 45 PEN 1   • rosuvastatin (CRESTOR) 10 MG Tab Take 1 Tab by mouth every day. 90 Tab 3   • amlodipine (NORVASC) 5 MG Tab Take 1 Tab by mouth every day. 90 Tab 1   • allopurinol (ZYLOPRIM) 100 MG Tab Take 1 Tab by mouth every day. TAKE 1 TAB BY MOUTH EVERY DAY. 90 Tab 3   • vitamin D (CHOLECALCIFEROL) 1000 UNIT Tab Take 1,000 Units by mouth every day.     • pioglitazone (ACTOS) 30 MG Tab Take 1 Tab by mouth every day. 90 Tab 3   • furosemide (LASIX) 40 MG Tab Take 1 Tab by mouth every day. 90 Tab 3   • Carvedilol Phosphate (COREG CR) 20 MG CAPSULE SR 24 HR Take 1 Cap by mouth every day. 90 Cap 2   • glucose blood (ONE TOUCH ULTRA TEST) strip 1 Strip by Other route BID 2 DAYS A WEEK. 100 Strip 3   • Pseudoephedrine HCl (SUDAFED 12 HOUR PO) Take  by mouth as needed.       • warfarin (COUMADIN) 3 MG TABS Take 0.5 Tabs by mouth every day. Followed by Dr. Palomino 1 Each 0   • Ferrous Sulfate (IRON) 325 (65 FE) MG TABS Take 1 Tab by mouth every day.     • niacin 500 MG TABS Take 500 mg by mouth every day.     • azithromycin (ZITHROMAX) 250 MG Tab 2 tablets Day 1, then 1 tablet a day 6 Tab 0     No current facility-administered medications for this visit.          Allergies as of 10/25/2017 - Reviewed 10/25/2017   Allergen Reaction Noted   • Nkda [no known drug allergy]  02/15/2008        ROS: Denies any chest pain, Shortness of breath, Changes bowel or bladder, Lower extremity edema.    Physical Exam:  /64   Pulse 60   Temp 37.1 °C (98.7 °F)   Resp 16   Ht 1.778 m (5' 10\")   Wt 89.8 kg (198 lb)   SpO2 93%   BMI 28.41 kg/m²   Gen.: Well-developed, well-nourished, no apparent distress,pleasant and cooperative with the examination  Skin:  Warm and dry with good turgor. No rashes or suspicious lesions in visible areas  HEENT:Sinuses nontender with palpation, TMs clear, nares patent with pink mucosa and clear rhinorrhea,no septal deviation ,polyps or lesions. lips without lesions, " oropharynx clear.  Neck: Trachea midline,no masses or adenopathy. No JVD.  Cor: Regular rate and rhythm without murmur, gallop or rub.  Lungs: Respirations unlabored.Clear to auscultation with equal breath sounds bilaterally. No wheezes, rhonchi.  Extremities: No cyanosis, clubbing or edema.        Assessment and Plan.   84 y.o. male     1. Type 2 diabetes mellitus without complication, with long-term current use of insulin (CMS-HCC)  Adequately controlled.Most recent A1C was 5.7  Continue on Levimere 19 units, and Starlix ( Nateglinide) 120 mg bid.  Stop the Actos .  Consider stopping the insulin in the futrue if blood glucose continues to be controlled.    2. Paroxysmal atrial fibrillation (CMS-HCC)  Stable, asymptomatic. No RVR.  Continue coumadin.  Continue follow up with anticoagulation clinic.    3. Dyslipidemia  He has been tolerating the statin. Denies muscle pain LFTs has been normal  Continue Crestor 10 mg daily.    4. Essential hypertension  Has been adequately controlled on current medication. Denies headache, chest pain, and SOB.  Continue on Amlodipine 5 mg daily.

## 2017-11-01 ENCOUNTER — OFFICE VISIT (OUTPATIENT)
Dept: MEDICAL GROUP | Facility: MEDICAL CENTER | Age: 82
End: 2017-11-01
Payer: COMMERCIAL

## 2017-11-01 VITALS
DIASTOLIC BLOOD PRESSURE: 64 MMHG | BODY MASS INDEX: 28.77 KG/M2 | HEIGHT: 70 IN | SYSTOLIC BLOOD PRESSURE: 110 MMHG | TEMPERATURE: 97.8 F | RESPIRATION RATE: 16 BRPM | HEART RATE: 60 BPM | WEIGHT: 201 LBS | OXYGEN SATURATION: 91 %

## 2017-11-01 DIAGNOSIS — M65.341 TRIGGER FINGER, RIGHT RING FINGER: ICD-10-CM

## 2017-11-01 PROCEDURE — 99214 OFFICE O/P EST MOD 30 MIN: CPT | Performed by: FAMILY MEDICINE

## 2017-11-01 RX ORDER — METHYLPREDNISOLONE ACETATE 80 MG/ML
80 INJECTION, SUSPENSION INTRA-ARTICULAR; INTRALESIONAL; INTRAMUSCULAR; SOFT TISSUE ONCE
Status: COMPLETED | OUTPATIENT
Start: 2017-11-01 | End: 2017-11-01

## 2017-11-01 RX ADMIN — METHYLPREDNISOLONE ACETATE 80 MG: 80 INJECTION, SUSPENSION INTRA-ARTICULAR; INTRALESIONAL; INTRAMUSCULAR; SOFT TISSUE at 16:04

## 2017-11-01 NOTE — PROGRESS NOTES
CC: Trigger finger: right ring finger.    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 He feels his right ring finger gets stuck in a bent position, and it snaps when he straightens it , like a trigger being pulled and released. Discussed with the patient that he has what we called trigger finger, and it occurs when inflammation narrows the space within the sheath that surrounds the tendon in the affected finger. If trigger finger is severe, your finger may become locked in a bent position. Discussed the need if injection, from 1 to 3 injection if no improvement will send for orthopedist for surgical release. He does not remember when it start exactly, it seems that it has been there fr a while, however lately it becomes more obvious, and painful, he has been using his hands a lot, specially for back yard cleaning and other work.    Patient Active Problem List    Diagnosis Date Noted   • Obesity (BMI 30-39.9) 01/18/2017   • Dyslipidemia 05/30/2016   • Paroxysmal atrial fibrillation (CMS-HCC) 05/07/2015   • History of DVT (deep vein thrombosis) 08/25/2014   • H/O vitamin D deficiency 08/25/2014   • Hyperuricemia 08/25/2014   • Prostate cancer (CMS-HCC) 08/25/2014   • SSS (sick sinus syndrome) (CMS-HCC) 09/19/2012   • Cardiac pacemaker in situ 09/01/2011   • Chronic lymphocytic leukemia (CMS-HCC) 09/01/2011   • Essential hypertension 09/01/2011   • CKD (chronic kidney disease) stage 3, GFR 30-59 ml/min 09/01/2011   • Diabetes mellitus type 2 in nonobese (CMS-HCC) 09/01/2011       Current Outpatient Prescriptions   Medication Sig Dispense Refill   • nateglinide (STARLIX) 120 MG Tab TAKE 1 TABLET BY MOUTH TWICE A  Tab 1   • insulin detemir (LEVEMIR FLEXPEN) 100 UNIT/ML Solution  "Pen-injector injection INJECT 25 UNITS AS DIRECTED AT BEDTIME 45 PEN 1   • rosuvastatin (CRESTOR) 10 MG Tab Take 1 Tab by mouth every day. 90 Tab 3   • amlodipine (NORVASC) 5 MG Tab Take 1 Tab by mouth every day. 90 Tab 1   • allopurinol (ZYLOPRIM) 100 MG Tab Take 1 Tab by mouth every day. TAKE 1 TAB BY MOUTH EVERY DAY. 90 Tab 3   • vitamin D (CHOLECALCIFEROL) 1000 UNIT Tab Take 1,000 Units by mouth every day.     • furosemide (LASIX) 40 MG Tab Take 1 Tab by mouth every day. 90 Tab 3   • Carvedilol Phosphate (COREG CR) 20 MG CAPSULE SR 24 HR Take 1 Cap by mouth every day. 90 Cap 2   • azithromycin (ZITHROMAX) 250 MG Tab 2 tablets Day 1, then 1 tablet a day 6 Tab 0   • glucose blood (ONE TOUCH ULTRA TEST) strip 1 Strip by Other route BID 2 DAYS A WEEK. 100 Strip 3   • Pseudoephedrine HCl (SUDAFED 12 HOUR PO) Take  by mouth as needed.       • warfarin (COUMADIN) 3 MG TABS Take 0.5 Tabs by mouth every day. Followed by Dr. Palomino 1 Each 0   • Ferrous Sulfate (IRON) 325 (65 FE) MG TABS Take 1 Tab by mouth every day.     • niacin 500 MG TABS Take 500 mg by mouth every day.     • pioglitazone (ACTOS) 30 MG Tab Take 1 Tab by mouth every day. 90 Tab 3     Current Facility-Administered Medications   Medication Dose Route Frequency Provider Last Rate Last Dose   • methylPREDNISolone acetate (DEPO-MEDROL) injection 80 mg  80 mg Intramuscular Once Kip Dyson M.D.             Allergies as of 11/01/2017 - Reviewed 11/01/2017   Allergen Reaction Noted   • Nkda [no known drug allergy]  02/15/2008        ROS: Denies any chest pain, Shortness of breath, Changes bowel or bladder, Lower extremity edema.    Physical Exam:  /64   Pulse 60   Temp 36.6 °C (97.8 °F)   Resp 16   Ht 1.778 m (5' 10\")   Wt 91.2 kg (201 lb)   SpO2 91%   BMI 28.84 kg/m²   Gen.: Well-developed, well-nourished, no apparent distress,pleasant and cooperative with the examination  Right ring finger :  right ring finger gets stuck in a bent position, " and it snaps when he straightens it     Procedure (Trigger finger) :  Patient was consented. Risks and benefits discussed.  Right hand was exposed. A point below A1 pulley on the right ring finger on the holman aspect was determined, The skin was sterilized with (2% chlorhexidine , and 70% Isopropyl alcohol), the area was numbed with ethyl cholride , then 0.5 ml of Depo    Assessment and Plan.   84 y.o. male     1. Trigger finger, right ring finger  Cortisone shot is given, patient felt immediate improvement in pain, and mild in finger movement.  Wait up to 2 weeks if no improvement need to come for an other shot.  Discussed with patient the possibility of needing up to 3 shots, and if no improvement, he will go for surgical release.    - INJECTION - SINGLE TENDON SHEATH/LIGAMENT; Future  - methylPREDNISolone acetate (DEPO-MEDROL) injection 80 mg; 1 mL by Intramuscular route Once.

## 2017-11-02 ENCOUNTER — ANTICOAGULATION VISIT (OUTPATIENT)
Dept: VASCULAR LAB | Facility: MEDICAL CENTER | Age: 82
End: 2017-11-02
Attending: INTERNAL MEDICINE
Payer: COMMERCIAL

## 2017-11-02 VITALS — HEART RATE: 60 BPM | DIASTOLIC BLOOD PRESSURE: 63 MMHG | SYSTOLIC BLOOD PRESSURE: 136 MMHG

## 2017-11-02 DIAGNOSIS — I48.0 PAROXYSMAL ATRIAL FIBRILLATION (HCC): ICD-10-CM

## 2017-11-02 DIAGNOSIS — Z86.718 HISTORY OF DVT (DEEP VEIN THROMBOSIS): ICD-10-CM

## 2017-11-02 LAB — INR PPP: 2.6 (ref 2–3.5)

## 2017-11-02 PROCEDURE — 90662 IIV NO PRSV INCREASED AG IM: CPT

## 2017-11-02 PROCEDURE — 99211 OFF/OP EST MAY X REQ PHY/QHP: CPT

## 2017-11-02 PROCEDURE — 85610 PROTHROMBIN TIME: CPT

## 2017-11-02 PROCEDURE — 90471 IMMUNIZATION ADMIN: CPT

## 2017-11-02 NOTE — PROGRESS NOTES
Anticoagulation Summary  As of 11/2/2017    INR goal:   2.0-3.0   TTR:   69.1 % (2.5 y)   Today's INR:   2.6   Maintenance plan:   3 mg (3 mg x 1) every day   Weekly total:   21 mg   Plan last modified:   Abdoul Lamar, PharmD (9/6/2017)   Next INR check:   11/16/2017   Target end date:       Indications    Paroxysmal atrial fibrillation (CMS-HCC) [I48.0]  History of DVT (deep vein thrombosis) [Z86.718]             Anticoagulation Episode Summary     [facility] check location:   [facility]    Preferred lab:   [facility]    Send INR reminders to:       Comments:   Fax progress note to Dr. Prajapati [phone]      Anticoagulation Care Providers     Provider Role Specialty Phone number    Tito Prajapati M.D. Referring Cardiology [phone]    [facility] Anticoagulation Services Responsible  [phone]        Anticoagulation Patient Findings      HPI:  Werner Sondes seen in clinic today, on anticoagulation therapy with warfarin for PAF.  Changes to current medical/health status since last appt: None.   Denies signs/symptoms of bleeding and/or thrombosis since the last appt.    Denies any interval changes to diet  Denies any interval changes to medications since last appt.   Denies any complications or cost restrictions with current therapy.   BP recorded in vitals.  Pt consented to receiving immunization today.     A/P   INR  therapeutic.   Pt is to continue with current warfarin dosing regimen.     Follow up appointment in 2 week(s).    Miguel Jurado, PharmD

## 2017-11-03 LAB — INR BLD: 2.6 (ref 0.9–1.2)

## 2017-11-07 RX ORDER — NATEGLINIDE 120 MG/1
TABLET ORAL
Qty: 180 TAB | Refills: 1 | Status: SHIPPED | OUTPATIENT
Start: 2017-11-07 | End: 2018-06-13 | Stop reason: SDUPTHER

## 2017-11-16 ENCOUNTER — ANTICOAGULATION VISIT (OUTPATIENT)
Dept: VASCULAR LAB | Facility: MEDICAL CENTER | Age: 82
End: 2017-11-16
Attending: INTERNAL MEDICINE
Payer: COMMERCIAL

## 2017-11-16 DIAGNOSIS — Z86.718 HISTORY OF DVT (DEEP VEIN THROMBOSIS): ICD-10-CM

## 2017-11-16 DIAGNOSIS — I48.0 PAROXYSMAL ATRIAL FIBRILLATION (HCC): ICD-10-CM

## 2017-11-16 LAB
INR BLD: 1.3 (ref 0.9–1.2)
INR PPP: 1.3 (ref 2–3.5)

## 2017-11-16 PROCEDURE — 85610 PROTHROMBIN TIME: CPT

## 2017-11-16 PROCEDURE — 99212 OFFICE O/P EST SF 10 MIN: CPT | Performed by: PHARMACIST

## 2017-11-16 NOTE — PROGRESS NOTES
Anticoagulation Summary  As of 11/16/2017    INR goal:   2.0-3.0   TTR:   68.7 % (2.5 y)   Today's INR:   1.3!   Maintenance plan:   4.5 mg (3 mg x 1.5) on Mon, Thu; 3 mg (3 mg x 1) all other days   Weekly total:   24 mg   Plan last modified:   Melissa Wick, PharmD (11/16/2017)   Next INR check:   11/30/2017   Target end date:       Indications    Paroxysmal atrial fibrillation (CMS-HCC) [I48.0]  History of DVT (deep vein thrombosis) [Z86.718]             Anticoagulation Episode Summary     [facility] check location[facility]   [facility]    Preferred lab:   [facility]    Send INR reminders to:       Comments:   Fax progress note to Dr. Prajapati [phone]      Anticoagulation Care Providers     Provider Role Specialty Phone number    Tito Prajapati M.D. Referring Cardiology [phone]    [facility] Anticoagulation Services Responsible  [phone]        Anticoagulation Patient Findings      HPI:  Werner Acuña seen in clinic today, on anticoagulation therapy with warfarin  for A-fib and history of DVT from 2005  Changes to current medical/health status since last appt: none  Denies signs/symptoms of bleeding and/or thrombosis since the last appt.    Patient started to use V8 juice twice a week.  Denies any interval changes to medications since last appt.   Denies any complications or cost restrictions with current therapy.   Pt declines vital check today.       A/P   INR  sub-therapeutic.     Bolus 6mg today x 1 dose then start increased weekly dose (~14%) to adjust to new diet with V8 juice. No bridging indicated as his DVT was > 10 years ago.    Follow up appointment in 2 week(s).    Melissa Wick, PharmD

## 2017-11-30 ENCOUNTER — ANTICOAGULATION VISIT (OUTPATIENT)
Dept: VASCULAR LAB | Facility: MEDICAL CENTER | Age: 82
End: 2017-11-30
Attending: INTERNAL MEDICINE
Payer: COMMERCIAL

## 2017-11-30 DIAGNOSIS — Z86.718 HISTORY OF DVT (DEEP VEIN THROMBOSIS): ICD-10-CM

## 2017-11-30 DIAGNOSIS — I48.0 PAROXYSMAL ATRIAL FIBRILLATION (HCC): ICD-10-CM

## 2017-11-30 LAB
INR BLD: 2.5 (ref 0.9–1.2)
INR PPP: 2.5 (ref 2–3.5)

## 2017-11-30 PROCEDURE — 99211 OFF/OP EST MAY X REQ PHY/QHP: CPT

## 2017-11-30 PROCEDURE — 85610 PROTHROMBIN TIME: CPT

## 2017-11-30 NOTE — PROGRESS NOTES
Anticoagulation Summary  As of 11/30/2017    INR goal:   2.0-3.0   TTR:   68.3 % (2.5 y)   Today's INR:   2.5   Maintenance plan:   4.5 mg (3 mg x 1.5) on Mon; 3 mg (3 mg x 1) all other days   Weekly total:   22.5 mg   Plan last modified:   Miguel Jurado PharmD (11/30/2017)   Next INR check:   12/21/2017   Target end date:       Indications    Paroxysmal atrial fibrillation (CMS-HCC) [I48.0]  History of DVT (deep vein thrombosis) [Z86.718]             Anticoagulation Episode Summary     [facility] check location:   [facility]    Preferred lab:   [facility]    Send INR reminders to:       Comments:   Fax progress note to Dr. Prajapati [phone]      Anticoagulation Care Providers     Provider Role Specialty Phone number    Tito Prajapati M.D. Referring Cardiology [phone]    [facility] Anticoagulation Services Responsible  [phone]        Anticoagulation Patient Findings      HPI:  Werner Acuña seen in clinic today, on anticoagulation therapy with warfarin for PAF.  Changes to current medical/health status since last appt: non  Denies signs/symptoms of bleeding and/or thrombosis since the last appt.    Denies any interval changes to diet  Denies any interval changes to medications since last appt.   Denies any complications or cost restrictions with current therapy.   Declines vitals  Confirmed dosing regimen, pt was taking less warfarin than requested, however INR is therapeutic.     A/P   INR  therapeutic.   Pt is to continue with current warfarin dosing regimen.     Follow up appointment in 3 weeks per pt availability.     Miguel Jurado, JaredD

## 2017-12-04 DIAGNOSIS — E13.9 DIABETES 1.5, MANAGED AS TYPE 2 (HCC): ICD-10-CM

## 2017-12-20 DIAGNOSIS — I10 ESSENTIAL HYPERTENSION: ICD-10-CM

## 2017-12-20 RX ORDER — AMLODIPINE BESYLATE 5 MG/1
TABLET ORAL
Qty: 90 TAB | Refills: 1 | Status: SHIPPED | OUTPATIENT
Start: 2017-12-20 | End: 2018-06-13 | Stop reason: SDUPTHER

## 2017-12-21 ENCOUNTER — ANTICOAGULATION VISIT (OUTPATIENT)
Dept: VASCULAR LAB | Facility: MEDICAL CENTER | Age: 82
End: 2017-12-21
Attending: INTERNAL MEDICINE
Payer: COMMERCIAL

## 2017-12-21 DIAGNOSIS — I10 ESSENTIAL HYPERTENSION: ICD-10-CM

## 2017-12-21 DIAGNOSIS — Z86.718 HISTORY OF DVT (DEEP VEIN THROMBOSIS): ICD-10-CM

## 2017-12-21 DIAGNOSIS — I48.0 PAROXYSMAL ATRIAL FIBRILLATION (HCC): ICD-10-CM

## 2017-12-21 LAB
INR BLD: 2.9 (ref 0.9–1.2)
INR PPP: 2.9 (ref 2–3.5)

## 2017-12-21 PROCEDURE — 85610 PROTHROMBIN TIME: CPT

## 2017-12-21 PROCEDURE — 99211 OFF/OP EST MAY X REQ PHY/QHP: CPT | Performed by: PHARMACIST

## 2017-12-21 RX ORDER — CARVEDILOL PHOSPHATE 20 MG
CAPSULE,EXTENDED RELEASE MULTIPHASE 24HR ORAL
Qty: 90 CAP | Refills: 2 | Status: SHIPPED | OUTPATIENT
Start: 2017-12-21 | End: 2018-07-22 | Stop reason: SDUPTHER

## 2017-12-21 NOTE — PROGRESS NOTES
Anticoagulation Summary  As of 12/21/2017    INR goal:   2.0-3.0   TTR:   69.0 % (2.6 y)   Today's INR:   2.9   Maintenance plan:   4.5 mg (3 mg x 1.5) on Mon; 3 mg (3 mg x 1) all other days   Weekly total:   22.5 mg   Plan last modified:   Miguel Jurado, PharmD (11/30/2017)   Next INR check:   1/11/2018   Target end date:       Indications    Paroxysmal atrial fibrillation (CMS-HCC) [I48.0]  History of DVT (deep vein thrombosis) [Z86.718]             Anticoagulation Episode Summary     [facility]R check location[facility]   [facility]    Preferred lab:   [facility]    Send INR reminders to:       Comments:   Fax progress note to Dr. Prajapati [phone]      Anticoagulation Care Providers     Provider Role Specialty Phone number    Tito Prajapati M.D. Referring Cardiology [phone]    [facility] Anticoagulation Services Responsible  [phone]        Anticoagulation Patient Findings  Patient Findings     Negatives:   Signs/symptoms of thrombosis, Signs/symptoms of bleeding, Laboratory test error suspected, Change in health, Change in alcohol use, Change in activity, Upcoming invasive procedure, Emergency department visit, Upcoming dental procedure, Missed doses, Extra doses, Change in medications, Change in diet/appetite, Hospital admission, Bruising, Other complaints        HPI:   Werner Acuña seen in clinic today, on anticoagulation therapy with warfarin for stroke prevention/blood clot prevention due to history of atrial fibrillation and DVT.    Patient's previous INR was therapeutic at 2.5 on 11-30-17, at which time patient was instructed to continue current warfarin regimen.  He returns to clinic today to recheck INR to ensure it is therapeutic and thus preventing possible clotting and/or bleeding/bruising complications.      Does patient have any changes to current medical/health status since last appt (Y/N):  NO  Does patient have any signs/symptoms of bleeding and/or thrombosis since the last appt (Y/N):   NO  Does patient have any interval changes to diet or medications since last appt (Y/N):  NO  Are there any complications or cost restrictions with current therapy (Y/N):  NO      Vitals: Patient declines vitals today, too cold     Asssessment:      INR remains therapeutic at 2.9, therefore decreasing risk of blood clots and/or bleeding complications   Reason(s) for out of range INR today:  n/a      Plan:  Pt is to continue with current warfarin dosing regimen.     Follow up:  Because warfarin is a high risk medication and current CHEST guidelines recommend regular monitoring intervals (few days up to 12 weeks), will have patient return to clinic in 3 weeks to recheck INR.    Abdoul Lamar, PharmD

## 2018-01-11 ENCOUNTER — ANTICOAGULATION VISIT (OUTPATIENT)
Dept: VASCULAR LAB | Facility: MEDICAL CENTER | Age: 83
End: 2018-01-11
Attending: INTERNAL MEDICINE
Payer: COMMERCIAL

## 2018-01-11 DIAGNOSIS — I48.0 PAROXYSMAL ATRIAL FIBRILLATION (HCC): ICD-10-CM

## 2018-01-11 DIAGNOSIS — Z86.718 HISTORY OF DVT (DEEP VEIN THROMBOSIS): ICD-10-CM

## 2018-01-11 LAB — INR PPP: 2.8 (ref 2–3.5)

## 2018-01-11 PROCEDURE — 85610 PROTHROMBIN TIME: CPT

## 2018-01-11 PROCEDURE — 99211 OFF/OP EST MAY X REQ PHY/QHP: CPT

## 2018-01-11 NOTE — PROGRESS NOTES
Anticoagulation Summary  As of 1/11/2018    INR goal:   2.0-3.0   TTR:   69.7 % (2.7 y)   Today's INR:   2.8   Maintenance plan:   4.5 mg (3 mg x 1.5) on Mon; 3 mg (3 mg x 1) all other days   Weekly total:   22.5 mg   Plan last modified:   Jared RiceD (11/30/2017)   Next INR check:   2/1/2018   Target end date:       Indications    Paroxysmal atrial fibrillation (CMS-HCC) [I48.0]  History of DVT (deep vein thrombosis) [Z86.718]             Anticoagulation Episode Summary     INR check location[facility]   [facility]    Preferred lab:   [facility]    Send INR reminders to:       Comments:   Fax progress note to Dr. Prajapati [phone]      Anticoagulation Care Providers     Provider Role Specialty Phone number    Tito Prajapati M.D. Referring Cardiology [phone]    [facility] Anticoagulation Services Responsible  [phone]        Anticoagulation Patient Findings      HPI:  Werner Acuña seen in clinic today, on anticoagulation therapy with warfarin for PAF.   Changes to current medical/health status since last appt: none.  Denies signs/symptoms of bleeding and/or thrombosis since the last appt.    Denies any interval changes to diet  Denies any interval changes to medications since last appt.   Denies any complications or cost restrictions with current therapy.   Declines vitals.  Confirmed dosing regimen.     A/P   INR  therapeutic.   Pt is to continue with current warfarin dosing regimen.     Follow up appointment in 3 weeks per pt request.     Miguel Jurado, JaredD

## 2018-01-12 LAB — INR BLD: 2.8 (ref 0.9–1.2)

## 2018-01-16 DIAGNOSIS — Z79.4 TYPE 2 DIABETES MELLITUS WITHOUT COMPLICATION, WITH LONG-TERM CURRENT USE OF INSULIN (HCC): ICD-10-CM

## 2018-01-16 DIAGNOSIS — E11.9 TYPE 2 DIABETES MELLITUS WITHOUT COMPLICATION, WITH LONG-TERM CURRENT USE OF INSULIN (HCC): ICD-10-CM

## 2018-01-16 RX ORDER — PIOGLITAZONEHYDROCHLORIDE 30 MG/1
30 TABLET ORAL
Qty: 90 TAB | Refills: 3 | Status: SHIPPED | OUTPATIENT
Start: 2018-01-16 | End: 2019-01-26 | Stop reason: SDUPTHER

## 2018-02-01 ENCOUNTER — ANTICOAGULATION VISIT (OUTPATIENT)
Dept: VASCULAR LAB | Facility: MEDICAL CENTER | Age: 83
End: 2018-02-01
Attending: INTERNAL MEDICINE
Payer: COMMERCIAL

## 2018-02-01 DIAGNOSIS — I48.0 PAROXYSMAL ATRIAL FIBRILLATION (HCC): ICD-10-CM

## 2018-02-01 DIAGNOSIS — Z86.718 HISTORY OF DVT (DEEP VEIN THROMBOSIS): ICD-10-CM

## 2018-02-01 LAB
INR BLD: 2.8 (ref 0.9–1.2)
INR PPP: 2.8 (ref 2–3.5)

## 2018-02-01 PROCEDURE — 85610 PROTHROMBIN TIME: CPT

## 2018-02-01 PROCEDURE — 99211 OFF/OP EST MAY X REQ PHY/QHP: CPT

## 2018-02-01 NOTE — PROGRESS NOTES
Anticoagulation Summary  As of 2/1/2018    INR goal:   2.0-3.0   TTR:   70.3 % (2.7 y)   Today's INR:   2.8   Maintenance plan:   4.5 mg (3 mg x 1.5) on Mon; 3 mg (3 mg x 1) all other days   Weekly total:   22.5 mg   Plan last modified:   Miguel Jurado PharmD (11/30/2017)   Next INR check:   3/1/2018   Target end date:       Indications    Paroxysmal atrial fibrillation (CMS-HCC) [I48.0]  History of DVT (deep vein thrombosis) [Z86.718]             Anticoagulation Episode Summary     [facility]R check location[facility]   [facility]    Preferred lab:   [facility]    Send INR reminders to:       Comments:   Fax progress note to Dr. Prajapati [phone]      Anticoagulation Care Providers     Provider Role Specialty Phone number    Tito Prajapati M.D. Referring Cardiology [phone]    [facility] Anticoagulation Services Responsible  [phone]        Anticoagulation Patient Findings  Patient Findings     Negatives:   Signs/symptoms of thrombosis, Signs/symptoms of bleeding, Laboratory test error suspected, Change in health, Change in alcohol use, Change in activity, Upcoming invasive procedure, Emergency department visit, Upcoming dental procedure, Missed doses, Extra doses, Change in medications, Change in diet/appetite, Hospital admission, Bruising, Other complaints        History of Present Illness: follow up appointment for chronic anticoagulation with the high risk medication, warfarin for DVT.    Last INR was at goal.  Pt remains therapeutic today. Pt is to continue with current warfarin dosing regimen.    Follow up in 6 weeks, to reduce risk of adverse events related to this high risk medication,  Warfarin.  Aletha Junior, PharmD      Pt declines vitals today

## 2018-03-01 ENCOUNTER — ANTICOAGULATION VISIT (OUTPATIENT)
Dept: VASCULAR LAB | Facility: MEDICAL CENTER | Age: 83
End: 2018-03-01
Attending: INTERNAL MEDICINE
Payer: COMMERCIAL

## 2018-03-01 DIAGNOSIS — I48.0 PAROXYSMAL ATRIAL FIBRILLATION (HCC): ICD-10-CM

## 2018-03-01 DIAGNOSIS — Z86.718 HISTORY OF DVT (DEEP VEIN THROMBOSIS): ICD-10-CM

## 2018-03-01 LAB
INR BLD: 2.1 (ref 0.9–1.2)
INR PPP: 2.1 (ref 2–3.5)

## 2018-03-01 PROCEDURE — 99211 OFF/OP EST MAY X REQ PHY/QHP: CPT | Performed by: PHARMACIST

## 2018-03-01 PROCEDURE — 85610 PROTHROMBIN TIME: CPT

## 2018-03-01 NOTE — PROGRESS NOTES
Anticoagulation Summary  As of 3/1/2018    INR goal:   2.0-3.0   TTR:   71.1 % (2.8 y)   Today's INR:   2.1   Maintenance plan:   4.5 mg (3 mg x 1.5) on Mon; 3 mg (3 mg x 1) all other days   Weekly total:   22.5 mg   Plan last modified:   Miguel Jurado, PharmD (11/30/2017)   Next INR check:   4/5/2018   Target end date:       Indications    Paroxysmal atrial fibrillation (CMS-HCC) [I48.0]  History of DVT (deep vein thrombosis) [Z86.718]             Anticoagulation Episode Summary     [facility] check location[facility]   [facility]    Preferred lab:   [email or web address]    Send INR reminders to:       Comments:   Fax progress note to Dr. Prajapati [phone]      Anticoagulation Care Providers     Provider Role Specialty Phone number    Tito Prajapati M.D. Referring Cardiology [phone]    [facility] Anticoagulation Services Responsible  [phone]        Anticoagulation Patient Findings      HPI:  Werner Acuña seen in clinic today, on anticoagulation therapy with warfarin for Hx of DVT  Changes to current medical/health status since last appt: denies  Denies signs/symptoms of bleeding and/or thrombosis since the last appt.    Denies any interval changes to diet  Denies any interval changes to medications since last appt.   Denies any complications or cost restrictions with current therapy.   BP declined.      A/P   INR  is-therapeutic.   Will continue with the same warfarin dosing.     Follow up appointment in 5 week(s).    Melissa Wick, PharmD

## 2018-04-05 ENCOUNTER — ANTICOAGULATION VISIT (OUTPATIENT)
Dept: VASCULAR LAB | Facility: MEDICAL CENTER | Age: 83
End: 2018-04-05
Attending: INTERNAL MEDICINE
Payer: COMMERCIAL

## 2018-04-05 VITALS — HEART RATE: 55 BPM | SYSTOLIC BLOOD PRESSURE: 138 MMHG | DIASTOLIC BLOOD PRESSURE: 66 MMHG

## 2018-04-05 DIAGNOSIS — I48.0 PAROXYSMAL ATRIAL FIBRILLATION (HCC): ICD-10-CM

## 2018-04-05 DIAGNOSIS — Z86.718 HISTORY OF DVT (DEEP VEIN THROMBOSIS): ICD-10-CM

## 2018-04-05 LAB — INR PPP: 4.4 (ref 2–3.5)

## 2018-04-05 PROCEDURE — 99212 OFFICE O/P EST SF 10 MIN: CPT | Performed by: NURSE PRACTITIONER

## 2018-04-05 PROCEDURE — 85610 PROTHROMBIN TIME: CPT

## 2018-04-05 NOTE — PROGRESS NOTES
Anticoagulation Summary  As of 4/5/2018    INR goal:   2.0-3.0   TTR:   70.1 % (2.9 y)   Today's INR:   4.4!   Maintenance plan:   4.5 mg (3 mg x 1.5) on Mon; 3 mg (3 mg x 1) all other days   Weekly total:   22.5 mg   Plan last modified:   Miguel Juraod, PharmD (11/30/2017)   Next INR check:   4/19/2018   Target end date:       Indications    Paroxysmal atrial fibrillation (CMS-HCC) [I48.0]  History of DVT (deep vein thrombosis) [Z86.718]             Anticoagulation Episode Summary     [facility] check location[facility]   [facility]    Preferred lab:   [facility]    Send INR reminders to:       Comments:   Fax progress note to Dr. Prajapati [phone]      Anticoagulation Care Providers     Provider Role Specialty Phone number    Tito Prajapati M.D. Referring Cardiology [phone]    [facility] Anticoagulation Services Responsible  [phone]        Anticoagulation Patient Findings      HPI:  Werner Acuña seen in clinic today for follow up on anticoagulation therapy in the presence of AF. Denies any changes to current medical/health status since last appointment. Denies any medication or diet changes. No current symptoms of bleeding or thrombosis reported. Drinking cranberry juice which he will stop.    A/P:   INR supratherapeutic. HOLD tonight then continue current regimen. BP recorded in vitals.    Follow up appointment in 2 week(s).    Next Appointment: Thursday, April 19, 2018 at 8:45 am.     Azra NOONAN

## 2018-04-06 LAB — INR BLD: 4.4 (ref 0.9–1.2)

## 2018-04-19 ENCOUNTER — ANTICOAGULATION VISIT (OUTPATIENT)
Dept: VASCULAR LAB | Facility: MEDICAL CENTER | Age: 83
End: 2018-04-19
Attending: INTERNAL MEDICINE
Payer: COMMERCIAL

## 2018-04-19 VITALS — SYSTOLIC BLOOD PRESSURE: 113 MMHG | DIASTOLIC BLOOD PRESSURE: 76 MMHG | HEART RATE: 53 BPM

## 2018-04-19 DIAGNOSIS — I48.0 PAROXYSMAL ATRIAL FIBRILLATION (HCC): ICD-10-CM

## 2018-04-19 DIAGNOSIS — Z86.718 HISTORY OF DVT (DEEP VEIN THROMBOSIS): ICD-10-CM

## 2018-04-19 LAB
INR BLD: 2.8 (ref 0.9–1.2)
INR PPP: 2.8 (ref 2–3.5)

## 2018-04-19 PROCEDURE — 99211 OFF/OP EST MAY X REQ PHY/QHP: CPT | Performed by: NURSE PRACTITIONER

## 2018-04-19 PROCEDURE — 85610 PROTHROMBIN TIME: CPT

## 2018-04-19 NOTE — PROGRESS NOTES
Anticoagulation Summary  As of 4/19/2018    INR goal:   2.0-3.0   TTR:   69.3 % (2.9 y)   Today's INR:   2.8   Maintenance plan:   4.5 mg (3 mg x 1.5) on Mon; 3 mg (3 mg x 1) all other days   Weekly total:   22.5 mg   Plan last modified:   Miguel Jurado, PharmD (11/30/2017)   Next INR check:   5/17/2018   Target end date:       Indications    Paroxysmal atrial fibrillation (CMS-HCC) [I48.0]  History of DVT (deep vein thrombosis) [Z86.718]             Anticoagulation Episode Summary     [facility]R check location[facility]   [facility]    Preferred lab:   [facility]    Send INR reminders to:       Comments:   Fax progress note to Dr. López [phone]      Anticoagulation Care Providers     Provider Role Specialty Phone number    Kyle López D.O. Referring Cardiology [phone]    [facility] Anticoagulation Services Responsible  [phone]        Anticoagulation Patient Findings      HPI:  Werner Acuña seen in clinic today for follow up on anticoagulation therapy in the presence of AF, DVT hx. Denies any changes to current medical/health status since last appointment. Denies any medication or diet changes. No current symptoms of bleeding or thrombosis reported.    A/P:   INR therapeutic. Continue current regimen. BP recorded in vitals.    Follow up appointment in 4 week(s) per pt's preference.    Next Appointment: Thursday, May 17, 2018 at  8:45 am.    Azra NOONAN

## 2018-05-07 DIAGNOSIS — I10 ESSENTIAL HYPERTENSION: ICD-10-CM

## 2018-05-07 RX ORDER — FUROSEMIDE 40 MG/1
TABLET ORAL
Qty: 90 TAB | Refills: 3 | Status: SHIPPED | OUTPATIENT
Start: 2018-05-07

## 2018-05-17 ENCOUNTER — ANTICOAGULATION VISIT (OUTPATIENT)
Dept: VASCULAR LAB | Facility: MEDICAL CENTER | Age: 83
End: 2018-05-17
Attending: INTERNAL MEDICINE
Payer: COMMERCIAL

## 2018-05-17 VITALS — SYSTOLIC BLOOD PRESSURE: 116 MMHG | HEART RATE: 57 BPM | DIASTOLIC BLOOD PRESSURE: 55 MMHG

## 2018-05-17 DIAGNOSIS — Z86.718 HISTORY OF DVT (DEEP VEIN THROMBOSIS): ICD-10-CM

## 2018-05-17 DIAGNOSIS — I48.0 PAROXYSMAL ATRIAL FIBRILLATION (HCC): ICD-10-CM

## 2018-05-17 LAB
INR BLD: 2 (ref 0.9–1.2)
INR PPP: 2 (ref 2–3.5)

## 2018-05-17 PROCEDURE — 99211 OFF/OP EST MAY X REQ PHY/QHP: CPT

## 2018-05-17 PROCEDURE — 85610 PROTHROMBIN TIME: CPT

## 2018-05-17 NOTE — PROGRESS NOTES
Anticoagulation Summary  As of 5/17/2018    INR goal:   2.0-3.0   TTR:   70.1 % (3 y)   Today's INR:   2.0   Warfarin maintenance plan:   4.5 mg (3 mg x 1.5) on Mon; 3 mg (3 mg x 1) all other days   Weekly warfarin total:   22.5 mg   Plan last modified:   Miguel Jurado, PharmD (11/30/2017)   Next INR check:   6/14/2018   Target end date:       Indications    Paroxysmal atrial fibrillation (HCC) [I48.0]  History of DVT (deep vein thrombosis) [Z86.718]             Anticoagulation Episode Summary     [facility] check location[facility]   [facility]    Preferred lab:   [facility]    Send INR reminders to:       Comments:   Fax progress note to Dr. López [phone]      Anticoagulation Care Providers     Provider Role Specialty Phone number    Kyle López D.O. Referring Cardiology [phone]    [facility] Anticoagulation Services Responsible  [phone]        Anticoagulation Patient Findings  Patient Findings     Positives:   Missed doses    Negatives:   Signs/symptoms of thrombosis, Signs/symptoms of bleeding, Laboratory test error suspected, Change in health, Change in alcohol use, Change in activity, Upcoming invasive procedure, Emergency department visit, Upcoming dental procedure, Extra doses, Change in medications, Change in diet/appetite, Hospital admission, Bruising, Other complaints        History of Present Illness: follow up appointment for chronic anticoagulation with the high risk medication, warfarin for atrial fibrillation/DVT.    Last INR was out of range, dosage adjusted: pt is s/p pacemaker placement.  Pt is back to the therapeutic range. Pt is to continue with current warfarin dosing regimen.  Follow up in 4 weeks, to reduce risk of adverse events related to this high risk medication,  Warfarin.    Aletha Junior, PharmD

## 2018-05-29 DIAGNOSIS — E13.9 DIABETES 1.5, MANAGED AS TYPE 2 (HCC): ICD-10-CM

## 2018-06-04 RX ORDER — ALLOPURINOL 100 MG/1
TABLET ORAL
Qty: 90 TAB | Refills: 3 | Status: SHIPPED | OUTPATIENT
Start: 2018-06-04 | End: 2019-06-24 | Stop reason: SDUPTHER

## 2018-06-13 DIAGNOSIS — I10 ESSENTIAL HYPERTENSION: ICD-10-CM

## 2018-06-13 RX ORDER — AMLODIPINE BESYLATE 5 MG/1
TABLET ORAL
Qty: 90 TAB | Refills: 3 | Status: SHIPPED | OUTPATIENT
Start: 2018-06-13 | End: 2019-05-27 | Stop reason: SDUPTHER

## 2018-06-13 RX ORDER — NATEGLINIDE 120 MG/1
TABLET ORAL
Qty: 180 TAB | Refills: 3 | Status: SHIPPED | OUTPATIENT
Start: 2018-06-13 | End: 2019-05-27 | Stop reason: SDUPTHER

## 2018-06-14 ENCOUNTER — ANTICOAGULATION VISIT (OUTPATIENT)
Dept: VASCULAR LAB | Facility: MEDICAL CENTER | Age: 83
End: 2018-06-14
Attending: INTERNAL MEDICINE
Payer: COMMERCIAL

## 2018-06-14 VITALS — SYSTOLIC BLOOD PRESSURE: 118 MMHG | HEART RATE: 59 BPM | DIASTOLIC BLOOD PRESSURE: 67 MMHG

## 2018-06-14 DIAGNOSIS — I48.0 PAROXYSMAL ATRIAL FIBRILLATION (HCC): ICD-10-CM

## 2018-06-14 DIAGNOSIS — Z86.718 HISTORY OF DVT (DEEP VEIN THROMBOSIS): ICD-10-CM

## 2018-06-14 LAB
INR BLD: 2.4 (ref 0.9–1.2)
INR PPP: 2.4 (ref 2–3.5)

## 2018-06-14 PROCEDURE — 85610 PROTHROMBIN TIME: CPT

## 2018-06-14 PROCEDURE — 99211 OFF/OP EST MAY X REQ PHY/QHP: CPT

## 2018-06-14 NOTE — PROGRESS NOTES
Anticoagulation Summary  As of 6/14/2018    INR goal:   2.0-3.0   TTR:   70.8 % (3.1 y)   Today's INR:   2.4   Warfarin maintenance plan:   4.5 mg (3 mg x 1.5) on Mon; 3 mg (3 mg x 1) all other days   Weekly warfarin total:   22.5 mg   Plan last modified:   Miguel Jurado PharmD (11/30/2017)   Next INR check:   7/19/2018   Target end date:       Indications    Paroxysmal atrial fibrillation (HCC) [I48.0]  History of DVT (deep vein thrombosis) [Z86.718]             Anticoagulation Episode Summary     [facility] check [facility]   [facility]    Preferred lab:   [facility]    Send INR reminders to:       Comments:   Fax progress note to Dr. López [phone]      Anticoagulation Care Providers     Provider Role Specialty Phone number    Kyle López D.O. Referring Cardiology [phone]    [facility] Anticoagulation Services Responsible  [phone]        Anticoagulation Patient Findings      HPI:  Werner Acuña seen in clinic today, on anticoagulation therapy with warfarin for DVT.  Changes to current medical/health status since last appt: none  Denies signs/symptoms of bleeding and/or thrombosis since the last appt.    Denies any interval changes to diet  Denies any interval changes to medications since last appt.   Denies any complications or cost restrictions with current therapy.   BP recorded in vitals.  Confirmed dosing regimen.     A/P   INR  therapeutic.   Pt is to continue with current warfarin dosing regimen.     Follow up appointment in 5 week(s).    Miguel Jurado, JaredD

## 2018-06-22 DIAGNOSIS — E08.00 DIABETES MELLITUS DUE TO UNDERLYING CONDITION WITH HYPEROSMOLARITY WITHOUT COMA (HCC): ICD-10-CM

## 2018-06-22 RX ORDER — INSULIN DETEMIR 100 [IU]/ML
INJECTION, SOLUTION SUBCUTANEOUS
Qty: 10 ML | Refills: 1 | Status: SHIPPED | OUTPATIENT
Start: 2018-06-22 | End: 2018-10-19 | Stop reason: SDUPTHER

## 2018-07-19 ENCOUNTER — ANTICOAGULATION VISIT (OUTPATIENT)
Dept: VASCULAR LAB | Facility: MEDICAL CENTER | Age: 83
End: 2018-07-19
Attending: INTERNAL MEDICINE
Payer: COMMERCIAL

## 2018-07-19 VITALS — HEART RATE: 58 BPM | SYSTOLIC BLOOD PRESSURE: 140 MMHG | DIASTOLIC BLOOD PRESSURE: 83 MMHG

## 2018-07-19 DIAGNOSIS — I48.0 PAROXYSMAL ATRIAL FIBRILLATION (HCC): ICD-10-CM

## 2018-07-19 DIAGNOSIS — Z86.718 HISTORY OF DVT (DEEP VEIN THROMBOSIS): ICD-10-CM

## 2018-07-19 LAB
INR PPP: 3 (ref 2–3.5)
INR PPP: 3 (ref 2–3.5)

## 2018-07-19 PROCEDURE — 99211 OFF/OP EST MAY X REQ PHY/QHP: CPT

## 2018-07-19 PROCEDURE — 85610 PROTHROMBIN TIME: CPT

## 2018-07-19 NOTE — PROGRESS NOTES
Anticoagulation Summary  As of 7/19/2018    INR goal:   2.0-3.0   TTR:   71.7 % (3.2 y)   Today's INR:   3.0   Warfarin maintenance plan:   4.5 mg (3 mg x 1.5) on Mon; 3 mg (3 mg x 1) all other days   Weekly warfarin total:   22.5 mg   Plan last modified:   Miguel Jurado, PharmD (11/30/2017)   Next INR check:   8/30/2018   Target end date:       Indications    Paroxysmal atrial fibrillation (HCC) [I48.0]  History of DVT (deep vein thrombosis) [Z86.718]             Anticoagulation Episode Summary     INR check location:   [facility]    Preferred lab:   [facility]    Send INR reminders to:       Comments:   Fax progress note to Dr. López [phone]      Anticoagulation Care Providers     Provider Role Specialty Phone number    Kyle López D.O. Referring Cardiology [phone]    [facility] Anticoagulation Services Responsible  [phone]        Anticoagulation Patient Findings    History of Present Illness: follow up appointment for chronic anticoagulation with the high risk medication, warfarin for DVT.    Pt remains therapeutic today. Pt is to continue with current warfarin dosing regimen.    Will increase interval to recheck INR. Follow up in 6 weeks, to reduce risk of adverse events related to this high risk medication,  Warfarin.    Chikis Reyes, Pharmacy Resident    I have reviewed and agree with the plan above on 07/19/2018      Aletha Junior, JaredD

## 2018-07-22 DIAGNOSIS — I10 ESSENTIAL HYPERTENSION: ICD-10-CM

## 2018-07-23 RX ORDER — CARVEDILOL PHOSPHATE 20 MG/1
CAPSULE, EXTENDED RELEASE ORAL
Qty: 90 CAP | Refills: 2 | Status: SHIPPED | OUTPATIENT
Start: 2018-07-23 | End: 2019-03-07 | Stop reason: SDUPTHER

## 2018-07-24 LAB — INR BLD: 3 (ref 0.9–1.2)

## 2018-08-30 ENCOUNTER — ANTICOAGULATION VISIT (OUTPATIENT)
Dept: VASCULAR LAB | Facility: MEDICAL CENTER | Age: 83
End: 2018-08-30
Attending: INTERNAL MEDICINE
Payer: COMMERCIAL

## 2018-08-30 DIAGNOSIS — I48.0 PAROXYSMAL ATRIAL FIBRILLATION (HCC): ICD-10-CM

## 2018-08-30 DIAGNOSIS — Z86.718 HISTORY OF DVT (DEEP VEIN THROMBOSIS): ICD-10-CM

## 2018-08-30 LAB
INR BLD: 2.4 (ref 0.9–1.2)
INR PPP: 2.4 (ref 2–3.5)

## 2018-08-30 PROCEDURE — 85610 PROTHROMBIN TIME: CPT

## 2018-08-30 PROCEDURE — 99211 OFF/OP EST MAY X REQ PHY/QHP: CPT | Performed by: NURSE PRACTITIONER

## 2018-08-30 NOTE — PROGRESS NOTES
Anticoagulation Summary  As of 8/30/2018    INR goal:   2.0-3.0   TTR:   72.7 % (3.3 y)   Today's INR:   2.4   Warfarin maintenance plan:   4.5 mg (3 mg x 1.5) on Mon; 3 mg (3 mg x 1) all other days   Weekly warfarin total:   22.5 mg   Plan last modified:   Miguel Jurado, PharmD (11/30/2017)   Next INR check:   9/13/2018   Target end date:       Indications    Paroxysmal atrial fibrillation (HCC) [I48.0]  History of DVT (deep vein thrombosis) [Z86.718]             Anticoagulation Episode Summary     INR check location:   [facility]    Preferred lab:   [facility]    Send INR reminders to:       Comments:   Fax progress note to Dr. López [phone]      Anticoagulation Care Providers     Provider Role Specialty Phone number    Kyle López D.O. Referring Cardiology [phone]    [facility] Anticoagulation Services Responsible  [phone]        Anticoagulation Patient Findings      HPI:  Werner Acuña seen in clinic today for follow up on anticoagulation therapy in the presence of AF, DVT hx. Denies any changes to current medical/health status since last appointment. Denies any medication or diet changes. No current symptoms of bleeding or thrombosis reported.    A/P:   INR therapeutic. Continue current regimen. Unable to obtain BP due to equipment malfunction. Will obtain next visit.    Follow up appointment in 2 week(s).    Next Appointment: Thursday, September 13, 2018 at 9:30 am.    Azra NOONAN

## 2018-09-13 ENCOUNTER — ANTICOAGULATION VISIT (OUTPATIENT)
Dept: VASCULAR LAB | Facility: MEDICAL CENTER | Age: 83
End: 2018-09-13
Attending: INTERNAL MEDICINE
Payer: COMMERCIAL

## 2018-09-13 VITALS — SYSTOLIC BLOOD PRESSURE: 120 MMHG | DIASTOLIC BLOOD PRESSURE: 72 MMHG | HEART RATE: 59 BPM

## 2018-09-13 DIAGNOSIS — Z86.718 HISTORY OF DVT (DEEP VEIN THROMBOSIS): ICD-10-CM

## 2018-09-13 DIAGNOSIS — I48.0 PAROXYSMAL ATRIAL FIBRILLATION (HCC): ICD-10-CM

## 2018-09-13 LAB — INR PPP: 2.9 (ref 2–3.5)

## 2018-09-13 PROCEDURE — 85610 PROTHROMBIN TIME: CPT

## 2018-09-13 PROCEDURE — 99211 OFF/OP EST MAY X REQ PHY/QHP: CPT | Performed by: PHARMACIST

## 2018-09-13 NOTE — PROGRESS NOTES
Anticoagulation Summary  As of 9/13/2018    INR goal:   2.0-3.0   TTR:   73.0 % (3.3 y)   Today's INR:   2.9   Warfarin maintenance plan:   4.5 mg (3 mg x 1.5) on Mon; 3 mg (3 mg x 1) all other days   Weekly warfarin total:   22.5 mg   Plan last modified:   Miguel Jurado, PharmD (11/30/2017)   Next INR check:   9/27/2018   Target end date:       Indications    Paroxysmal atrial fibrillation (HCC) [I48.0]  History of DVT (deep vein thrombosis) [Z86.718]             Anticoagulation Episode Summary     INR check location:   [facility]    Preferred lab:   [facility]    Send INR reminders to:       Comments:   Fax progress note to Dr. López [phone]      Anticoagulation Care Providers     Provider Role Specialty Phone number    Kyle López D.O. Referring Cardiology [phone]    [facility] Anticoagulation Services Responsible  [phone]        Anticoagulation Patient Findings      HPI:  Werner Acuña seen in clinic today, on anticoagulation therapy with warfarin for afib.  Changes to current medical/health status since last appt: None  Denies signs/symptoms of bleeding and/or thrombosis since the last appt.    Denies any interval changes to diet  Denies any interval changes to medications since last appt.   Denies any complications or cost restrictions with current therapy.   BP recorded in vitals.      A/P   INR therapeutic.   Pt to continue current dosing regimen.    Follow up appointment in 2 week(s).    Melissa Wick, PharmD

## 2018-09-18 LAB — INR BLD: 2.9 (ref 0.9–1.2)

## 2018-09-22 DIAGNOSIS — E78.6 HDL DEFICIENCY: ICD-10-CM

## 2018-09-24 RX ORDER — ROSUVASTATIN CALCIUM 10 MG/1
TABLET, COATED ORAL
Qty: 90 TAB | Refills: 3 | Status: SHIPPED | OUTPATIENT
Start: 2018-09-24

## 2018-09-27 ENCOUNTER — ANTICOAGULATION VISIT (OUTPATIENT)
Dept: VASCULAR LAB | Facility: MEDICAL CENTER | Age: 83
End: 2018-09-27
Attending: INTERNAL MEDICINE
Payer: COMMERCIAL

## 2018-09-27 VITALS — DIASTOLIC BLOOD PRESSURE: 80 MMHG | SYSTOLIC BLOOD PRESSURE: 130 MMHG | HEART RATE: 60 BPM

## 2018-09-27 DIAGNOSIS — Z86.718 HISTORY OF DVT (DEEP VEIN THROMBOSIS): ICD-10-CM

## 2018-09-27 DIAGNOSIS — I48.0 PAROXYSMAL ATRIAL FIBRILLATION (HCC): ICD-10-CM

## 2018-09-27 LAB
INR BLD: 2.2 (ref 0.9–1.2)
INR PPP: 2.2 (ref 2–3.5)

## 2018-09-27 PROCEDURE — 85610 PROTHROMBIN TIME: CPT

## 2018-09-27 PROCEDURE — 99211 OFF/OP EST MAY X REQ PHY/QHP: CPT | Performed by: NURSE PRACTITIONER

## 2018-09-27 NOTE — PROGRESS NOTES
Anticoagulation Summary  As of 9/27/2018    INR goal:   2.0-3.0   TTR:   73.3 % (3.4 y)   Today's INR:   2.2   Warfarin maintenance plan:   4.5 mg (3 mg x 1.5) on Mon; 3 mg (3 mg x 1) all other days   Weekly warfarin total:   22.5 mg   Plan last modified:   Miguel Jurado, PharmD (11/30/2017)   Next INR check:   10/18/2018   Target end date:       Indications    Paroxysmal atrial fibrillation (HCC) [I48.0]  History of DVT (deep vein thrombosis) [Z86.718]             Anticoagulation Episode Summary     INR check location:   [facility]    Preferred lab:   [facility]    Send INR reminders to:       Comments:   Fax progress note to Dr. López [phone]      Anticoagulation Care Providers     Provider Role Specialty Phone number    Kyle López D.O. Referring Cardiology [phone]    [facility] Anticoagulation Services Responsible  [phone]        Anticoagulation Patient Findings      HPI:  Werner Acuña seen in clinic today for follow up on anticoagulation therapy in the presence of AF, DVT hx. Denies any changes to current medical/health status since last appointment. Denies any medication or diet changes. No current symptoms of bleeding or thrombosis reported.    A/P:   INR therapeutic. Continue current regimen. BP recorded in vitals.    Follow up appointment in 3 week(s).    Next Appointment: Thursday, October 18, 2018 at 9:00 am.      Azra NOONAN

## 2018-10-17 ENCOUNTER — NON-PROVIDER VISIT (OUTPATIENT)
Dept: MEDICAL GROUP | Facility: MEDICAL CENTER | Age: 83
End: 2018-10-17
Payer: COMMERCIAL

## 2018-10-17 DIAGNOSIS — Z23 NEED FOR VACCINATION: ICD-10-CM

## 2018-10-17 PROCEDURE — 90471 IMMUNIZATION ADMIN: CPT | Performed by: FAMILY MEDICINE

## 2018-10-17 PROCEDURE — 90662 IIV NO PRSV INCREASED AG IM: CPT | Performed by: FAMILY MEDICINE

## 2018-10-17 NOTE — NON-PROVIDER
"Werner Acuña is a 85 y.o. male here for a non-provider visit for:   FLU    Reason for immunization: Annual Flu Vaccine  Immunization records indicate need for vaccine: Yes, confirmed with Epic  Minimum interval has been met for this vaccine: Yes  ABN completed: Yes    Order and dose verified by: TATIANA  VIS Dated  08/07/2015 was given to patient: Yes  All IAC Questionnaire questions were answered \"No.\"    Patient tolerated injection and no adverse effects were observed or reported: Yes    Pt scheduled for next dose in series: No  "

## 2018-10-18 ENCOUNTER — ANTICOAGULATION VISIT (OUTPATIENT)
Dept: VASCULAR LAB | Facility: MEDICAL CENTER | Age: 83
End: 2018-10-18
Attending: INTERNAL MEDICINE
Payer: COMMERCIAL

## 2018-10-18 VITALS — SYSTOLIC BLOOD PRESSURE: 108 MMHG | HEART RATE: 57 BPM | DIASTOLIC BLOOD PRESSURE: 58 MMHG

## 2018-10-18 DIAGNOSIS — Z86.718 HISTORY OF DVT (DEEP VEIN THROMBOSIS): ICD-10-CM

## 2018-10-18 DIAGNOSIS — I48.0 PAROXYSMAL ATRIAL FIBRILLATION (HCC): ICD-10-CM

## 2018-10-18 LAB
INR BLD: 2.2 (ref 0.9–1.2)
INR PPP: 2.2 (ref 2–3.5)

## 2018-10-18 PROCEDURE — 85610 PROTHROMBIN TIME: CPT

## 2018-10-18 PROCEDURE — 99211 OFF/OP EST MAY X REQ PHY/QHP: CPT | Performed by: NURSE PRACTITIONER

## 2018-10-18 NOTE — PROGRESS NOTES
Anticoagulation Summary  As of 10/18/2018    INR goal:   2.0-3.0   TTR:   73.8 % (3.4 y)   Today's INR:   2.2   Warfarin maintenance plan:   4.5 mg (3 mg x 1.5) on Mon; 3 mg (3 mg x 1) all other days   Weekly warfarin total:   22.5 mg   Plan last modified:   Miguel Jurado, PharmD (11/30/2017)   Next INR check:      Target end date:       Indications    Paroxysmal atrial fibrillation (HCC) [I48.0]  History of DVT (deep vein thrombosis) [Z86.718]             Anticoagulation Episode Summary     INR check location:   [facility]    Preferred lab:   [facility]    Send INR reminders to:       Comments:   Fax progress note to Dr. López [phone]      Anticoagulation Care Providers     Provider Role Specialty Phone number    Kyle López D.O. Referring Cardiology [phone]    [facility] Anticoagulation Services Responsible  [phone]        Anticoagulation Patient Findings      HPI:  Werner Acuña seen in clinic today for follow up on anticoagulation therapy in the presence of AF, DVT hx. Denies any changes to current medical/health status since last appointment. Denies any medication or diet changes. No current symptoms of bleeding or thrombosis reported.    A/P:   INR therapeutic. Continue current regimen. BP recorded in vitals.    Follow up appointment in 4 week(s).    Next Appointment: Thursday, November 15, 2018 at 9:45 am.     Azra NOONAN

## 2018-10-19 DIAGNOSIS — E08.00 DIABETES MELLITUS DUE TO UNDERLYING CONDITION WITH HYPEROSMOLARITY WITHOUT COMA (HCC): ICD-10-CM

## 2018-10-21 RX ORDER — INSULIN DETEMIR 100 [IU]/ML
INJECTION, SOLUTION SUBCUTANEOUS
Qty: 10 ML | Refills: 3 | Status: SHIPPED | OUTPATIENT
Start: 2018-10-21 | End: 2019-03-13 | Stop reason: SDUPTHER

## 2018-11-15 ENCOUNTER — ANTICOAGULATION VISIT (OUTPATIENT)
Dept: VASCULAR LAB | Facility: MEDICAL CENTER | Age: 83
End: 2018-11-15
Attending: INTERNAL MEDICINE
Payer: COMMERCIAL

## 2018-11-15 DIAGNOSIS — I48.0 PAROXYSMAL ATRIAL FIBRILLATION (HCC): ICD-10-CM

## 2018-11-15 DIAGNOSIS — Z86.718 HISTORY OF DVT (DEEP VEIN THROMBOSIS): ICD-10-CM

## 2018-11-15 LAB — INR PPP: 3.8 (ref 2–3.5)

## 2018-11-15 PROCEDURE — 85610 PROTHROMBIN TIME: CPT

## 2018-11-15 PROCEDURE — 99212 OFFICE O/P EST SF 10 MIN: CPT | Performed by: PHARMACIST

## 2018-11-15 NOTE — PROGRESS NOTES
Anticoagulation Summary  As of 11/15/2018    INR goal:   2.0-3.0   TTR:   73.3 % (3.5 y)   Today's INR:   3.8!   Warfarin maintenance plan:   4.5 mg (3 mg x 1.5) on Mon; 3 mg (3 mg x 1) all other days   Weekly warfarin total:   22.5 mg   Plan last modified:   Miguel Jurado, PharmD (11/30/2017)   Next INR check:   11/29/2018   Target end date:   Indefinite    Indications    Paroxysmal atrial fibrillation (HCC) [I48.0]  History of DVT (deep vein thrombosis) [Z86.718]             Anticoagulation Episode Summary     INR check location:   [facility]    Preferred lab:   [facility]    Send INR reminders to:       Comments:   Fax progress note to Dr. López [phone]      Anticoagulation Care Providers     Provider Role Specialty Phone number    Kyle López D.O. Referring Cardiology [phone]    [facility] Anticoagulation Services Responsible  [phone]        Anticoagulation Patient Findings  Patient Findings     Negatives:   Signs/symptoms of thrombosis, Signs/symptoms of bleeding, Laboratory test error suspected, Change in health, Change in alcohol use, Change in activity, Upcoming invasive procedure, Emergency department visit, Upcoming dental procedure, Missed doses, Extra doses, Change in medications, Change in diet/appetite, Hospital admission, Bruising, Other complaints          HPI:  Werner Amrit Arianne seen in clinic today, on anticoagulation therapy with warfarin for blood clot and stroke prevention due to hx of PAF and DVT.  Changes to current medical/health status since last appt: NONE  Denies signs/symptoms of bleeding and/or thrombosis since the last appt.    Denies any interval changes to diet  Denies any interval changes to medications since last appt.   Denies any complications or cost restrictions with current therapy.   BP recorded in vitals.      A/P   INR  supra-therapeutic at 3.8.   Instructed patient to HOLD X 1, then resume current warfarin regimen.    Follow up  appointment in 2 week(s).    Abdoul Lamar, PharmD

## 2018-11-21 LAB — INR BLD: 3.8 (ref 0.9–1.2)

## 2018-11-29 ENCOUNTER — ANTICOAGULATION VISIT (OUTPATIENT)
Dept: VASCULAR LAB | Facility: MEDICAL CENTER | Age: 83
End: 2018-11-29
Attending: INTERNAL MEDICINE
Payer: COMMERCIAL

## 2018-11-29 VITALS — DIASTOLIC BLOOD PRESSURE: 60 MMHG | SYSTOLIC BLOOD PRESSURE: 105 MMHG | HEART RATE: 56 BPM

## 2018-11-29 DIAGNOSIS — Z86.718 HISTORY OF DVT (DEEP VEIN THROMBOSIS): ICD-10-CM

## 2018-11-29 DIAGNOSIS — I48.0 PAROXYSMAL ATRIAL FIBRILLATION (HCC): ICD-10-CM

## 2018-11-29 LAB — INR PPP: 2.5 (ref 2–3.5)

## 2018-11-29 PROCEDURE — 99211 OFF/OP EST MAY X REQ PHY/QHP: CPT

## 2018-11-29 PROCEDURE — 85610 PROTHROMBIN TIME: CPT

## 2018-11-29 NOTE — PROGRESS NOTES
Anticoagulation Summary  As of 11/29/2018    INR goal:   2.0-3.0   TTR:   72.9 % (3.5 y)   Today's INR:   2.5   Warfarin maintenance plan:   4.5 mg (3 mg x 1.5) on Mon; 3 mg (3 mg x 1) all other days   Weekly warfarin total:   22.5 mg   Plan last modified:   Miguel Jurado PharmD (11/30/2017)   Next INR check:   12/20/2018   Target end date:   Indefinite    Indications    Paroxysmal atrial fibrillation (HCC) [I48.0]  History of DVT (deep vein thrombosis) [Z86.718]             Anticoagulation Episode Summary     INR check location:   [facility]    Preferred lab:   [facility]    Send INR reminders to:       Comments:   Fax progress note to Dr. López [phone]      Anticoagulation Care Providers     Provider Role Specialty Phone number    Kyle López D.O. Referring Cardiology [phone]    [facility] Anticoagulation Services Responsible  [phone]        Anticoagulation Patient Findings      HPI:  Werner Acuña seen in clinic today, on anticoagulation therapy with warfarin for AF.   Changes to current medical/health status since last appt: none  Denies signs/symptoms of bleeding and/or thrombosis since the last appt.    Denies any interval changes to diet  Denies any interval changes to medications since last appt.   Denies any complications or cost restrictions with current therapy.   BP recorded in vitals.  Confirmed dosing regimen.     A/P   INR  therapeutic.   Pt is to continue with current warfarin dosing regimen.     Follow up appointment in 3 week(s).    Miguel Jurado, JaredD

## 2018-11-30 LAB — INR BLD: 2.5 (ref 0.9–1.2)

## 2018-12-20 ENCOUNTER — ANTICOAGULATION VISIT (OUTPATIENT)
Dept: VASCULAR LAB | Facility: MEDICAL CENTER | Age: 83
End: 2018-12-20
Attending: INTERNAL MEDICINE
Payer: COMMERCIAL

## 2018-12-20 DIAGNOSIS — I48.0 PAROXYSMAL ATRIAL FIBRILLATION (HCC): ICD-10-CM

## 2018-12-20 DIAGNOSIS — Z86.718 HISTORY OF DVT (DEEP VEIN THROMBOSIS): ICD-10-CM

## 2018-12-20 LAB
INR BLD: 2.5 (ref 0.9–1.2)
INR PPP: 2.5 (ref 2–3.5)

## 2018-12-20 PROCEDURE — 85610 PROTHROMBIN TIME: CPT

## 2018-12-20 PROCEDURE — 99211 OFF/OP EST MAY X REQ PHY/QHP: CPT

## 2018-12-20 NOTE — PROGRESS NOTES
Anticoagulation Summary  As of 12/20/2018    INR goal:   2.0-3.0   TTR:   73.3 % (3.6 y)   Today's INR:   2.5   Warfarin maintenance plan:   4.5 mg (3 mg x 1.5) on Mon; 3 mg (3 mg x 1) all other days   Weekly warfarin total:   22.5 mg   Plan last modified:   Miguel Jurado PharmD (11/30/2017)   Next INR check:   1/17/2019   Target end date:   Indefinite    Indications    Paroxysmal atrial fibrillation (HCC) [I48.0]  History of DVT (deep vein thrombosis) [Z86.718]             Anticoagulation Episode Summary     INR check location:   [facility]    Preferred lab:   [facility]    Send INR reminders to:       Comments:   Fax progress note to Dr. López [phone]      Anticoagulation Care Providers     Provider Role Specialty Phone number    Kyle López D.O. Referring Cardiology [phone]    [facility] Anticoagulation Services Responsible  [phone]        Anticoagulation Patient Findings      HPI:  Werner Acuña seen in clinic today, on anticoagulation therapy with warfarin for PAF.   Changes to current medical/health status since last appt: none  Denies signs/symptoms of bleeding and/or thrombosis since the last appt.    Denies any interval changes to diet  Denies any interval changes to medications since last appt.   Denies any complications or cost restrictions with current therapy.   Declines vitals.  Confirmed dosing regimen.     A/P   INR  therapeutic.   Pt is to continue with current warfarin dosing regimen.     Follow up appointment in 4 week(s).    Miguel Jurado, JaredD

## 2019-01-17 ENCOUNTER — ANTICOAGULATION VISIT (OUTPATIENT)
Dept: VASCULAR LAB | Facility: MEDICAL CENTER | Age: 84
End: 2019-01-17
Attending: INTERNAL MEDICINE
Payer: COMMERCIAL

## 2019-01-17 VITALS — HEART RATE: 59 BPM | DIASTOLIC BLOOD PRESSURE: 70 MMHG | SYSTOLIC BLOOD PRESSURE: 115 MMHG

## 2019-01-17 DIAGNOSIS — I48.0 PAROXYSMAL ATRIAL FIBRILLATION (HCC): ICD-10-CM

## 2019-01-17 DIAGNOSIS — Z86.718 HISTORY OF DVT (DEEP VEIN THROMBOSIS): ICD-10-CM

## 2019-01-17 LAB
INR BLD: 2.2 (ref 0.9–1.2)
INR PPP: 2.2 (ref 2–3.5)

## 2019-01-17 PROCEDURE — 99211 OFF/OP EST MAY X REQ PHY/QHP: CPT | Performed by: NURSE PRACTITIONER

## 2019-01-17 PROCEDURE — 85610 PROTHROMBIN TIME: CPT

## 2019-01-17 NOTE — PROGRESS NOTES
Anticoagulation Summary  As of 1/17/2019    INR goal:   2.0-3.0   TTR:   73.9 % (3.7 y)   Today's INR:   2.2   Warfarin maintenance plan:   4.5 mg (3 mg x 1.5) on Mon; 3 mg (3 mg x 1) all other days   Weekly warfarin total:   22.5 mg   Plan last modified:   Miguel Jurado, PharmD (11/30/2017)   Next INR check:   2/14/2019   Target end date:   Indefinite    Indications    Paroxysmal atrial fibrillation (HCC) [I48.0]  History of DVT (deep vein thrombosis) [Z86.718]             Anticoagulation Episode Summary     INR check location:   [facility]    Preferred lab:   [facility]    Send INR reminders to:       Comments:   Fax progress note to Dr. López [phone]      Anticoagulation Care Providers     Provider Role Specialty Phone number    Kyle López D.O. Referring Cardiology [phone]    [facility] Anticoagulation Services Responsible  [phone]        Anticoagulation Patient Findings      HPI:  Werner Acuña seen in clinic today for follow up on anticoagulation therapy in the presence of AF, DVT. Denies any changes to current medical/health status since last appointment. Denies any medication or diet changes. No current symptoms of bleeding or thrombosis reported.    A/P:   INR therapeutic. Continue current regimen. BP recorded in vitals.    Follow up appointment in 4 week(s).    Next Appointment: Thursday, February 14, 2019 at 9:45 am.    Azra NOONAN                    
- - -

## 2019-01-26 DIAGNOSIS — E11.9 TYPE 2 DIABETES MELLITUS WITHOUT COMPLICATION, WITH LONG-TERM CURRENT USE OF INSULIN (HCC): ICD-10-CM

## 2019-01-26 DIAGNOSIS — Z79.4 TYPE 2 DIABETES MELLITUS WITHOUT COMPLICATION, WITH LONG-TERM CURRENT USE OF INSULIN (HCC): ICD-10-CM

## 2019-01-28 RX ORDER — PIOGLITAZONEHYDROCHLORIDE 30 MG/1
TABLET ORAL
Qty: 90 TAB | Refills: 3 | Status: SHIPPED | OUTPATIENT
Start: 2019-01-28 | End: 2020-01-27

## 2019-02-14 ENCOUNTER — ANTICOAGULATION VISIT (OUTPATIENT)
Dept: VASCULAR LAB | Facility: MEDICAL CENTER | Age: 84
End: 2019-02-14
Attending: INTERNAL MEDICINE
Payer: COMMERCIAL

## 2019-02-14 VITALS — DIASTOLIC BLOOD PRESSURE: 64 MMHG | HEART RATE: 57 BPM | SYSTOLIC BLOOD PRESSURE: 113 MMHG

## 2019-02-14 DIAGNOSIS — Z86.718 HISTORY OF DVT (DEEP VEIN THROMBOSIS): ICD-10-CM

## 2019-02-14 DIAGNOSIS — I48.0 PAROXYSMAL ATRIAL FIBRILLATION (HCC): ICD-10-CM

## 2019-02-14 LAB
INR BLD: 3 (ref 0.9–1.2)
INR PPP: 3 (ref 2–3.5)

## 2019-02-14 PROCEDURE — 85610 PROTHROMBIN TIME: CPT

## 2019-02-14 PROCEDURE — 99211 OFF/OP EST MAY X REQ PHY/QHP: CPT

## 2019-02-14 NOTE — PROGRESS NOTES
Anticoagulation Summary  As of 2/14/2019    INR goal:   2.0-3.0   TTR:   74.4 % (3.8 y)   INR used for dosing:   3 (2/14/2019)   Warfarin maintenance plan:   4.5 mg (3 mg x 1.5) every Mon; 3 mg (3 mg x 1) all other days   Weekly warfarin total:   22.5 mg   Plan last modified:   Miguel Jurado, Osiris (11/30/2017)   Next INR check:   3/14/2019   Target end date:   Indefinite    Indications    Paroxysmal atrial fibrillation (HCC) [I48.0]  History of DVT (deep vein thrombosis) [Z86.718]             Anticoagulation Episode Summary     INR check location:   [facility]    Preferred lab:   [facility]    Send INR reminders to:       Comments:   Fax progress note to Dr. López [phone]      Anticoagulation Care Providers     Provider Role Specialty Phone number    Kyle López D.O. Referring Cardiology [phone]    [facility] Anticoagulation Services Responsible  [phone]        Anticoagulation Patient Findings      HPI:  Werner Acuña seen in clinic today, on anticoagulation therapy with warfarin for DVT.   Changes to current medical/health status since last appt: none  Denies signs/symptoms of bleeding and/or thrombosis since the last appt.    Denies any interval changes to diet  Denies any interval changes to medications since last appt.   Denies any complications or cost restrictions with current therapy.   BP recorded in vitals.  Confirmed dosing regimen.     A/P   INR  therapeutic.   Pt is to continue with current warfarin dosing regimen.     Follow up appointment in 4 week(s).    Miguel Jurado, JaredD

## 2019-03-07 DIAGNOSIS — I10 ESSENTIAL HYPERTENSION: ICD-10-CM

## 2019-03-07 RX ORDER — CARVEDILOL PHOSPHATE 20 MG/1
20 CAPSULE, EXTENDED RELEASE ORAL
Qty: 90 CAP | Refills: 0 | Status: SHIPPED | OUTPATIENT
Start: 2019-03-07 | End: 2019-08-14 | Stop reason: SDUPTHER

## 2019-03-14 ENCOUNTER — ANTICOAGULATION VISIT (OUTPATIENT)
Dept: VASCULAR LAB | Facility: MEDICAL CENTER | Age: 84
End: 2019-03-14
Attending: INTERNAL MEDICINE
Payer: COMMERCIAL

## 2019-03-14 VITALS — SYSTOLIC BLOOD PRESSURE: 125 MMHG | DIASTOLIC BLOOD PRESSURE: 71 MMHG | HEART RATE: 58 BPM

## 2019-03-14 DIAGNOSIS — I48.0 PAROXYSMAL ATRIAL FIBRILLATION (HCC): ICD-10-CM

## 2019-03-14 DIAGNOSIS — Z86.718 HISTORY OF DVT (DEEP VEIN THROMBOSIS): ICD-10-CM

## 2019-03-14 LAB
INR BLD: 2.5 (ref 0.9–1.2)
INR PPP: 2.5 (ref 2–3.5)

## 2019-03-14 PROCEDURE — 85610 PROTHROMBIN TIME: CPT

## 2019-03-14 PROCEDURE — 99211 OFF/OP EST MAY X REQ PHY/QHP: CPT | Performed by: NURSE PRACTITIONER

## 2019-03-14 NOTE — PROGRESS NOTES
Anticoagulation Summary  As of 3/14/2019    INR goal:   2.0-3.0   TTR:   74.9 % (3.8 y)   INR used for dosin.5 (3/14/2019)   Warfarin maintenance plan:   4.5 mg (3 mg x 1.5) every Mon; 3 mg (3 mg x 1) all other days   Weekly warfarin total:   22.5 mg   Plan last modified:   Miguel Jurado, PharmD (2017)   Next INR check:   2019   Target end date:   Indefinite    Indications    Paroxysmal atrial fibrillation (HCC) [I48.0]  History of DVT (deep vein thrombosis) [Z86.718]             Anticoagulation Episode Summary     INR check location:   [facility]    Preferred lab:   [facility]    Send INR reminders to:       Comments:   Fax progress note to Dr. López [phone]      Anticoagulation Care Providers     Provider Role Specialty Phone number    Kyle López D.O. Referring Cardiology [phone]    [facility] Anticoagulation Services Responsible  [phone]        Anticoagulation Patient Findings      HPI:  Werner Acuña seen in clinic today for follow up on anticoagulation therapy in the presence of AF, DVT hx. Denies any changes to current medical/health status since last appointment. Denies any medication or diet changes. No current symptoms of bleeding or thrombosis reported.    A/P:   INR therapeutic. Continue current regimen. BP recorded in vitals.    Follow up appointment in 5 week(s).    Next Appointment:  at 9:45 am.     Azra NOONAN

## 2019-03-25 NOTE — TELEPHONE ENCOUNTER
Was the patient seen in the last year in this department? Yes    Does patient have an active prescription for medications requested? Yes    Received Request Via: Pharmacy     Requesting 90 day supply

## 2019-04-18 ENCOUNTER — ANTICOAGULATION VISIT (OUTPATIENT)
Dept: VASCULAR LAB | Facility: MEDICAL CENTER | Age: 84
End: 2019-04-18
Attending: INTERNAL MEDICINE
Payer: COMMERCIAL

## 2019-04-18 DIAGNOSIS — I48.0 PAROXYSMAL ATRIAL FIBRILLATION (HCC): ICD-10-CM

## 2019-04-18 DIAGNOSIS — Z86.718 HISTORY OF DVT (DEEP VEIN THROMBOSIS): ICD-10-CM

## 2019-04-18 LAB — INR PPP: 2.8 (ref 2–3.5)

## 2019-04-18 PROCEDURE — 99211 OFF/OP EST MAY X REQ PHY/QHP: CPT

## 2019-04-18 PROCEDURE — 85610 PROTHROMBIN TIME: CPT

## 2019-04-18 NOTE — PROGRESS NOTES
Anticoagulation Summary  As of 2019    INR goal:   2.0-3.0   TTR:   75.5 % (3.9 y)   INR used for dosin.8 (2019)   Warfarin maintenance plan:   4.5 mg (3 mg x 1.5) every Mon; 3 mg (3 mg x 1) all other days   Weekly warfarin total:   22.5 mg   Plan last modified:   Miguel Jurado, PharmD (2017)   Next INR check:   2019   Target end date:   Indefinite    Indications    Paroxysmal atrial fibrillation (HCC) [I48.0]  History of DVT (deep vein thrombosis) [Z86.718]             Anticoagulation Episode Summary     INR check location:   [facility]    Preferred lab:   [facility]    Send INR reminders to:       Comments:   Fax progress note to Dr. López [phone]      Anticoagulation Care Providers     Provider Role Specialty Phone number    Kyle López D.O. Referring Cardiology [phone]    [facility] Anticoagulation Services Responsible  [phone]        Anticoagulation Patient Findings  Patient Findings     Negatives:   Signs/symptoms of thrombosis, Signs/symptoms of bleeding, Laboratory test error suspected, Change in health, Change in alcohol use, Change in activity, Upcoming invasive procedure, Emergency department visit, Upcoming dental procedure, Missed doses, Extra doses, Change in medications, Change in diet/appetite, Hospital admission, Bruising, Other complaints        History of Present Illness: follow up appointment for chronic anticoagulation with the high risk medication, warfarin for atrial fibrillation    Pt remains therapeutic today. Continue current dosing regimen.  Follow up in 2 weeks, to reduce the risk of adverse events related to this high risk medication, warfarin.    Aletha Junior Clinical Pharmacist  Pt declines vitals today

## 2019-04-19 LAB — INR BLD: 2.8 (ref 0.9–1.2)

## 2019-05-16 ENCOUNTER — OFFICE VISIT (OUTPATIENT)
Dept: MEDICAL GROUP | Facility: MEDICAL CENTER | Age: 84
End: 2019-05-16
Payer: COMMERCIAL

## 2019-05-16 VITALS
WEIGHT: 197.09 LBS | SYSTOLIC BLOOD PRESSURE: 96 MMHG | HEART RATE: 64 BPM | OXYGEN SATURATION: 89 % | TEMPERATURE: 97.5 F | BODY MASS INDEX: 28.22 KG/M2 | DIASTOLIC BLOOD PRESSURE: 50 MMHG | RESPIRATION RATE: 16 BRPM | HEIGHT: 70 IN

## 2019-05-16 DIAGNOSIS — E78.5 DYSLIPIDEMIA: ICD-10-CM

## 2019-05-16 DIAGNOSIS — I48.0 PAROXYSMAL ATRIAL FIBRILLATION (HCC): ICD-10-CM

## 2019-05-16 DIAGNOSIS — I49.5 SSS (SICK SINUS SYNDROME) (HCC): ICD-10-CM

## 2019-05-16 DIAGNOSIS — I10 ESSENTIAL HYPERTENSION: ICD-10-CM

## 2019-05-16 DIAGNOSIS — Z95.0 CARDIAC PACEMAKER IN SITU: ICD-10-CM

## 2019-05-16 DIAGNOSIS — E11.9 DIABETES MELLITUS TYPE 2 IN NONOBESE (HCC): ICD-10-CM

## 2019-05-16 LAB
HBA1C MFR BLD: 5.3 % (ref 0–5.6)
INT CON NEG: NEGATIVE
INT CON POS: POSITIVE

## 2019-05-16 PROCEDURE — 99214 OFFICE O/P EST MOD 30 MIN: CPT | Performed by: FAMILY MEDICINE

## 2019-05-16 PROCEDURE — 83036 HEMOGLOBIN GLYCOSYLATED A1C: CPT | Performed by: FAMILY MEDICINE

## 2019-05-16 ASSESSMENT — PATIENT HEALTH QUESTIONNAIRE - PHQ9: CLINICAL INTERPRETATION OF PHQ2 SCORE: 0

## 2019-05-16 NOTE — PROGRESS NOTES
CC: Diabetes, hypertension, pacemaker, sinus sick syndrome, A. fib, dyslipidemia    HPI:   Werner presents today discuss the following medical issues:    Diabetes mellitus type 2 in nonobese ([facility])  Patient has been having proper glycemic control.  A1c today is 5.3.  Patient has been on Levemir 10 units at night and Starlix 120 mg twice a day, and Actos 30 mg daily.  Denies hypoglycemic episodes.  Patient is due for monofilament foot exam.    Essential hypertension  Pressure today is slightly low.  However patient denies any lightheadedness, headache.  Is currently on amlodipine 5 mg daily, and carvedilol CR 20 mg     Cardiac pacemaker in situ/sick sinus syndrome  Patient has history of sick sinus syndrome underwent pacemaker placement, has been asymptomatic.    Paroxysmal atrial fibrillation ([facility])  Patient has been having irregular heartbeats, however adequately controlled heart rate.  She is currently on carvedilol and Coumadin.  [facility]    Dyslipidemia  He has been tolerating the statin. Denies muscle pain LFTs has been normal, has been Crestor 10 mg daily.        Patient Active Problem List    Diagnosis Date Noted   • Obesity (BMI 30-39.9) 01/18/2017   • Dyslipidemia 05/30/2016   • Paroxysmal atrial fibrillation ([facility]) 05/07/2015   • History of DVT (deep vein thrombosis) 08/25/2014   • H/O vitamin D deficiency 08/25/2014   • Hyperuricemia 08/25/2014   • Prostate cancer ([facility]) 08/25/2014   • SSS (sick sinus syndrome) ([facility]) 09/19/2012   • Cardiac pacemaker in situ 09/01/2011   • Chronic lymphocytic leukemia ([facility]) 09/01/2011   • Essential hypertension 09/01/2011   • CKD (chronic kidney disease) stage 3, GFR 30-59 ml/min ([facility]) 09/01/2011   • Diabetes mellitus type 2 in nonobese ([facility]) 09/01/2011       Current Outpatient Prescriptions   Medication Sig Dispense Refill   • insulin detemir (LEVEMIR FLEXTOUCH) 100 UNIT/ML Solution Pen-injector injection INJECT 25 UNITS SUBCUTANEOUSLY AS DIRECTED AT BEDTIME 30 mL 1   •  "Carvedilol Phosphate (COREG CR) 20 MG CAPSULE SR 24 HR Take 1 Cap by mouth every day. 90 Cap 0   • pioglitazone (ACTOS) 30 MG Tab TAKE 1 TABLET BY MOUTH EVERY DAY 90 Tab 3   • rosuvastatin (CRESTOR) 10 MG Tab TAKE 1 TABLET BY MOUTH EVERY DAY 90 Tab 3   • nateglinide (STARLIX) 120 MG Tab TAKE 1 TABLET BY MOUTH TWICE A  Tab 3   • amLODIPine (NORVASC) 5 MG Tab TAKE 1 TABLET BY MOUTH EVERY DAY. 90 Tab 3   • allopurinol (ZYLOPRIM) 100 MG Tab TAKE 1 TABLET BY MOUTH EVERY DAY 90 Tab 3   • glucose blood (ONE TOUCH ULTRA TEST) strip 1 Strip by Other route BID 2 DAYS A WEEK. 100 Strip 3   • furosemide (LASIX) 40 MG Tab TAKE 1 TABLET BY MOUTH EVERY DAY 90 Tab 3   • vitamin D (CHOLECALCIFEROL) 1000 UNIT Tab Take 1,000 Units by mouth every day.     • azithromycin (ZITHROMAX) 250 MG Tab 2 tablets Day 1, then 1 tablet a day 6 Tab 0   • Pseudoephedrine HCl (SUDAFED 12 HOUR PO) Take  by mouth as needed.       • warfarin (COUMADIN) 3 MG TABS Take 0.5 Tabs by mouth every day. Followed by Dr. Palomino 1 Each 0   • Ferrous Sulfate (IRON) 325 (65 FE) MG TABS Take 1 Tab by mouth every day.     • niacin 500 MG TABS Take 500 mg by mouth every day.       No current facility-administered medications for this visit.          Allergies as of 05/16/2019 - Reviewed 05/16/2019   Allergen Reaction Noted   • Nkda [no known drug allergy]  02/15/2008        ROS: Denies any chest pain, Shortness of breath, Changes bowel or bladder, Lower extremity edema.    Physical Exam:  BP (!) 96/50 (BP Location: Right arm, Patient Position: Sitting, BP Cuff Size: Adult)   Pulse 64   Temp 36.4 °C (97.5 °F) (Temporal)   Resp 16   Ht 1.778 m (5' 10\")   Wt 89.4 kg (197 lb 1.5 oz)   SpO2 89%   BMI 28.28 kg/m²   Gen.: Well-developed, well-nourished, no apparent distress,pleasant and cooperative with the examination  Skin:  Warm and dry with good turgor. No rashes or suspicious lesions in visible areas  HEENT:Sinuses nontender with palpation, TMs clear, nares " patent with pink mucosa and clear rhinorrhea,no septal deviation ,polyps or lesions. lips without lesions, oropharynx clear.  Neck: Trachea midline,no masses or adenopathy. No JVD.  Cor: Regular rate and rhythm without murmur, gallop or rub.  Lungs: Respirations unlabored.Clear to auscultation with equal breath sounds bilaterally. No wheezes, rhonchi.  Extremities: No cyanosis, clubbing or edema.      Monofilament: done  Monofilament testing with a 10 gram force: sensation intact: intact bilaterally  Visual Inspection: Feet without maceration, ulcers, fissures.  Pedal pulses: intact bilaterally    Assessment and Plan.   85 y.o. male     1. Diabetes mellitus type 2 in nonobese (HCC)  Adequately controlled.  A1c today is 5.3  Patient advised to stop Actos.  For now continue on Levemir 10 units at night and Starlix 120 mg twice a day.  Consider stopping Levemir next visit if A1c is still less than 6.0  Monofilament foot exam showed no sign of neuropathy.    - POCT  A1C  - Lipid Profile; Future  - MICROALBUMIN CREAT RATIO URINE (LAB COLLECT); Future  - Diabetic Monofilament LE Exam    2. Essential hypertension  Patient has slightly low blood pressure  Continue carvedilol 20 mg daily.  Will stop amlodipine.    3. Cardiac pacemaker in situ/ SSS (sick sinus syndrome) ([facility])  Stable.  Asymptomatic  Continue follow-up with cardiology    4. Paroxysmal atrial fibrillation ([facility])  Stable.  No RVR.  Continue on carvedilol and Coumadin.  Continue follow-up with his Coumadin clinic    5. Dyslipidemia  He has been tolerating the statin. Denies muscle pain LFTs has been normal  Continue Crestor 10 mg daily.

## 2019-05-27 DIAGNOSIS — I10 ESSENTIAL HYPERTENSION: ICD-10-CM

## 2019-05-28 RX ORDER — AMLODIPINE BESYLATE 5 MG/1
TABLET ORAL
Qty: 90 TAB | Refills: 3 | Status: SHIPPED | OUTPATIENT
Start: 2019-05-28 | End: 2020-05-29

## 2019-05-28 RX ORDER — NATEGLINIDE 120 MG/1
TABLET ORAL
Qty: 180 TAB | Refills: 3 | Status: SHIPPED | OUTPATIENT
Start: 2019-05-28 | End: 2020-05-29

## 2019-06-01 LAB
ALBUMIN/CREAT UR: 65.1 MG/G CREAT (ref 0–30)
CHOLEST SERPL-MCNC: 140 MG/DL (ref 100–199)
CREAT UR-MCNC: 65.1 MG/DL
HDLC SERPL-MCNC: 38 MG/DL
LABORATORY COMMENT REPORT: ABNORMAL
LDLC SERPL CALC-MCNC: 79 MG/DL (ref 0–99)
MICROALBUMIN UR-MCNC: 42.4 UG/ML
TRIGL SERPL-MCNC: 116 MG/DL (ref 0–149)
VLDLC SERPL CALC-MCNC: 23 MG/DL (ref 5–40)

## 2019-06-05 ENCOUNTER — ANTICOAGULATION VISIT (OUTPATIENT)
Dept: VASCULAR LAB | Facility: MEDICAL CENTER | Age: 84
End: 2019-06-05
Attending: FAMILY MEDICINE
Payer: COMMERCIAL

## 2019-06-05 VITALS — SYSTOLIC BLOOD PRESSURE: 103 MMHG | HEART RATE: 59 BPM | DIASTOLIC BLOOD PRESSURE: 55 MMHG

## 2019-06-05 DIAGNOSIS — I48.0 PAROXYSMAL ATRIAL FIBRILLATION (HCC): ICD-10-CM

## 2019-06-05 DIAGNOSIS — Z86.718 HISTORY OF DVT (DEEP VEIN THROMBOSIS): ICD-10-CM

## 2019-06-05 LAB — INR PPP: 2.2 (ref 2–3.5)

## 2019-06-05 PROCEDURE — 99211 OFF/OP EST MAY X REQ PHY/QHP: CPT | Performed by: NURSE PRACTITIONER

## 2019-06-05 PROCEDURE — 85610 PROTHROMBIN TIME: CPT

## 2019-06-05 RX ORDER — WARFARIN SODIUM 3 MG/1
TABLET ORAL
Qty: 110 TAB | Refills: 1 | Status: SHIPPED | OUTPATIENT
Start: 2019-06-05

## 2019-06-05 NOTE — PROGRESS NOTES
Anticoagulation Summary  As of 2019    INR goal:   2.0-3.0   TTR:   76.3 % (4.1 y)   INR used for dosin.20 (2019)   Warfarin maintenance plan:   4.5 mg (3 mg x 1.5) every Mon; 3 mg (3 mg x 1) all other days   Weekly warfarin total:   22.5 mg   Plan last modified:   Miguel Jurado, PharmD (2017)   Next INR check:   2019   Target end date:   Indefinite    Indications    Paroxysmal atrial fibrillation (HCC) [I48.0]  History of DVT (deep vein thrombosis) [Z86.718]             Anticoagulation Episode Summary     [facility]R check location[facility]   [facility]    Preferred lab:   [facility]    Send INR reminders to:       Comments:   Fax progress note to Dr. López [phone]      Anticoagulation Care Providers     Provider Role Specialty Phone number    Kyle López D.O. Referring Cardiology [phone]    [facility] Anticoagulation Services Responsible  [phone]        Anticoagulation Patient Findings      HPI:  Werner Acuña seen in clinic today for follow up on anticoagulation therapy in the presence of AF, DVT hx. Denies any changes to current medical/health status since last appointment. Denies any medication or diet changes. No current symptoms of bleeding or thrombosis reported.    A/P:   INR therapeutic. Continue current regimen. BP recorded in vitals.    Follow up appointment in 3 week(s).    Next Appointment:  at 9:45 am.     Azra NOONAN

## 2019-06-06 LAB — INR BLD: 2.2 (ref 0.9–1.2)

## 2019-06-24 RX ORDER — ALLOPURINOL 100 MG/1
TABLET ORAL
Qty: 90 TAB | Refills: 3 | Status: SHIPPED | OUTPATIENT
Start: 2019-06-24 | End: 2020-06-18

## 2019-06-27 ENCOUNTER — ANTICOAGULATION VISIT (OUTPATIENT)
Dept: VASCULAR LAB | Facility: MEDICAL CENTER | Age: 84
End: 2019-06-27
Attending: FAMILY MEDICINE
Payer: COMMERCIAL

## 2019-06-27 DIAGNOSIS — Z86.718 HISTORY OF DVT (DEEP VEIN THROMBOSIS): ICD-10-CM

## 2019-06-27 DIAGNOSIS — I48.0 PAROXYSMAL ATRIAL FIBRILLATION (HCC): ICD-10-CM

## 2019-06-27 LAB — INR PPP: 2.3 (ref 2–3.5)

## 2019-06-27 PROCEDURE — 85610 PROTHROMBIN TIME: CPT

## 2019-06-27 PROCEDURE — 99211 OFF/OP EST MAY X REQ PHY/QHP: CPT

## 2019-06-27 NOTE — PROGRESS NOTES
Anticoagulation Summary  As of 2019    INR goal:   2.0-3.0   TTR:   76.7 % (4.1 y)   INR used for dosin.30 (2019)   Warfarin maintenance plan:   4.5 mg (3 mg x 1.5) every Mon; 3 mg (3 mg x 1) all other days   Weekly warfarin total:   22.5 mg   Plan last modified:   Miguel Jurado, PharmD (2017)   Next INR check:   2019   Target end date:   Indefinite    Indications    Paroxysmal atrial fibrillation (HCC) [I48.0]  History of DVT (deep vein thrombosis) [Z86.718]             Anticoagulation Episode Summary     INR check location:   [facility]    Preferred lab:   [facility]    Send INR reminders to:       Comments:   Fax progress note to Dr. López [phone]      Anticoagulation Care Providers     Provider Role Specialty Phone number    Kyle López D.O. Referring Cardiology [phone]    [facility] Responsible  [phone]        Anticoagulation Patient Findings      HPI:  Werner Acuña seen in clinic today, on anticoagulation therapy with warfarin for PAF.   Changes to current medical/health status since last appt: none  Denies signs/symptoms of bleeding and/or thrombosis since the last appt.    Denies any interval changes to diet  Denies any interval changes to medications since last appt.   Denies any complications or cost restrictions with current therapy.   Declines vitals.  Confirmed dosing regimen.     A/P   INR  therapeutic.   Pt is to continue with current warfarin dosing regimen.     Follow up appointment in 4 weeks.     Miguel Jurado, JaredD

## 2019-06-28 LAB — INR BLD: 2.3 (ref 0.9–1.2)

## 2019-07-25 ENCOUNTER — ANTICOAGULATION VISIT (OUTPATIENT)
Dept: VASCULAR LAB | Facility: MEDICAL CENTER | Age: 84
End: 2019-07-25
Attending: INTERNAL MEDICINE
Payer: COMMERCIAL

## 2019-07-25 VITALS — HEART RATE: 58 BPM | DIASTOLIC BLOOD PRESSURE: 74 MMHG | SYSTOLIC BLOOD PRESSURE: 109 MMHG

## 2019-07-25 DIAGNOSIS — Z86.718 HISTORY OF DVT (DEEP VEIN THROMBOSIS): ICD-10-CM

## 2019-07-25 DIAGNOSIS — I48.0 PAROXYSMAL ATRIAL FIBRILLATION (HCC): ICD-10-CM

## 2019-07-25 LAB — INR PPP: 3.9 (ref 2–3.5)

## 2019-07-25 PROCEDURE — 99212 OFFICE O/P EST SF 10 MIN: CPT | Performed by: NURSE PRACTITIONER

## 2019-07-25 PROCEDURE — 85610 PROTHROMBIN TIME: CPT

## 2019-07-25 NOTE — PROGRESS NOTES
Anticoagulation Summary  As of 7/25/2019    INR goal:   2.0-3.0   TTR:   76.1 % (4.2 y)   INR used for dosing:   3.90! (7/25/2019)   Warfarin maintenance plan:   4.5 mg (3 mg x 1.5) every Mon; 3 mg (3 mg x 1) all other days   Weekly warfarin total:   22.5 mg   Plan last modified:   Miguel Jurado, PharmD (11/30/2017)   Next INR check:      Target end date:   Indefinite    Indications    Paroxysmal atrial fibrillation (HCC) [I48.0]  History of DVT (deep vein thrombosis) [Z86.718]             Anticoagulation Episode Summary     INR check location[facility]   [facility]    Preferred lab:   [facility]    Send INR reminders to:       Comments:   Fax progress note to Dr. López [phone]      Anticoagulation Care Providers     Provider Role Specialty Phone number    Kyle López D.O. Referring Cardiology [phone]    [facility] Anticoagulation Services Responsible  [phone]        Anticoagulation Patient Findings      HPI:  Werner Acuña seen in clinic today for follow up on anticoagulation therapy in the presence of AF, DVT hx.   He has been sick with a cold. Taking Mucinex as needed.   Denies any medication or diet changes.   No current symptoms of bleeding or thrombosis reported.    A/P:   INR supratherapeutic.   HOLD one dose then continue current regimen.   BP recorded in vitals.    Follow up appointment in 2 week(s).    Next Appointment: Thursday, August 8, 2019 at 9:30 am.    Azra NOONAN

## 2019-07-29 LAB — INR BLD: 3.9 (ref 0.9–1.2)

## 2019-08-02 DIAGNOSIS — E13.9 DIABETES 1.5, MANAGED AS TYPE 2 (HCC): ICD-10-CM

## 2019-08-08 ENCOUNTER — ANTICOAGULATION VISIT (OUTPATIENT)
Dept: VASCULAR LAB | Facility: MEDICAL CENTER | Age: 84
End: 2019-08-08
Attending: INTERNAL MEDICINE
Payer: COMMERCIAL

## 2019-08-08 DIAGNOSIS — I48.0 PAROXYSMAL ATRIAL FIBRILLATION (HCC): ICD-10-CM

## 2019-08-08 DIAGNOSIS — Z86.718 HISTORY OF DVT (DEEP VEIN THROMBOSIS): ICD-10-CM

## 2019-08-08 LAB — INR PPP: 3.6 (ref 2–3.5)

## 2019-08-08 PROCEDURE — 85610 PROTHROMBIN TIME: CPT

## 2019-08-08 PROCEDURE — 99212 OFFICE O/P EST SF 10 MIN: CPT | Performed by: PHARMACIST

## 2019-08-08 NOTE — PROGRESS NOTES
Anticoagulation Summary  As of 8/8/2019    INR goal:   2.0-3.0   TTR:   75.4 % (4.2 y)   INR used for dosing:   3.60! (8/8/2019)   Warfarin maintenance plan:   3 mg (3 mg x 1) every day   Weekly warfarin total:   21 mg   Plan last modified:   Melissa Wick PharmD (8/8/2019)   Next INR check:   8/22/2019   Target end date:   Indefinite    Indications    Paroxysmal atrial fibrillation (HCC) [I48.0]  History of DVT (deep vein thrombosis) [Z86.718]             Anticoagulation Episode Summary     INR check location:   [facility]    Preferred lab:   [facility]    Send INR reminders to:       Comments:   Fax progress note to Dr. López [phone]      Anticoagulation Care Providers     Provider Role Specialty Phone number    Kyle López D.O. Referring Cardiology [phone]    [facility] Anticoagulation Services Responsible  [phone]                Anticoagulation Patient Findings      HPI:  Werner Acuña seen in clinic today, on anticoagulation therapy with warfarin for PAF  Changes to current medical/health status since last appt: denies  Denies signs/symptoms of bleeding and/or thrombosis since the last appt.    Denies any interval changes to diet  Denies any interval changes to medications since last appt.   Denies any complications or cost restrictions with current therapy.   BP declined      A/P   INR  is supra-therapeutic.   Unclear why INR remains elevated.   Will have pt hold the warfarin dose today and then start 6% reduced weekly dose.     Follow up appointment in 2 week(s).    Melissa Wick, PharmD

## 2019-08-09 LAB — INR BLD: 3.6 (ref 0.9–1.2)

## 2019-08-14 DIAGNOSIS — I10 ESSENTIAL HYPERTENSION: ICD-10-CM

## 2019-08-14 RX ORDER — CARVEDILOL PHOSPHATE 20 MG/1
CAPSULE, EXTENDED RELEASE ORAL
Qty: 90 CAP | Refills: 3 | Status: SHIPPED | OUTPATIENT
Start: 2019-08-14 | End: 2020-08-24

## 2019-08-22 ENCOUNTER — ANTICOAGULATION VISIT (OUTPATIENT)
Dept: VASCULAR LAB | Facility: MEDICAL CENTER | Age: 84
End: 2019-08-22
Attending: INTERNAL MEDICINE
Payer: COMMERCIAL

## 2019-08-22 VITALS — SYSTOLIC BLOOD PRESSURE: 107 MMHG | HEART RATE: 57 BPM | DIASTOLIC BLOOD PRESSURE: 82 MMHG

## 2019-08-22 DIAGNOSIS — Z86.718 HISTORY OF DVT (DEEP VEIN THROMBOSIS): ICD-10-CM

## 2019-08-22 DIAGNOSIS — I48.0 PAROXYSMAL ATRIAL FIBRILLATION (HCC): ICD-10-CM

## 2019-08-22 LAB
INR BLD: 3.1 (ref 0.9–1.2)
INR PPP: 3.1 (ref 2–3.5)

## 2019-08-22 PROCEDURE — 99212 OFFICE O/P EST SF 10 MIN: CPT | Performed by: NURSE PRACTITIONER

## 2019-08-22 PROCEDURE — 85610 PROTHROMBIN TIME: CPT

## 2019-09-19 ENCOUNTER — ANTICOAGULATION VISIT (OUTPATIENT)
Dept: VASCULAR LAB | Facility: MEDICAL CENTER | Age: 84
End: 2019-09-19
Attending: INTERNAL MEDICINE
Payer: COMMERCIAL

## 2019-09-19 DIAGNOSIS — I48.0 PAROXYSMAL ATRIAL FIBRILLATION (HCC): ICD-10-CM

## 2019-09-19 DIAGNOSIS — Z86.718 HISTORY OF DVT (DEEP VEIN THROMBOSIS): ICD-10-CM

## 2019-09-19 LAB — INR PPP: 2.4 (ref 2–3.5)

## 2019-09-19 PROCEDURE — 99211 OFF/OP EST MAY X REQ PHY/QHP: CPT

## 2019-09-19 PROCEDURE — 85610 PROTHROMBIN TIME: CPT

## 2019-09-19 NOTE — PROGRESS NOTES
Anticoagulation Summary  As of 2019    INR goal:   2.0-3.0   TTR:   74.9 % (4.3 y)   INR used for dosin.40 (2019)   Warfarin maintenance plan:   1.5 mg (3 mg x 0.5) every Thu; 3 mg (3 mg x 1) all other days   Weekly warfarin total:   19.5 mg   Plan last modified:   ARNULFO Muñoz (2019)   Next INR check:   10/31/2019   Target end date:   Indefinite    Indications    Paroxysmal atrial fibrillation (HCC) [I48.0]  History of DVT (deep vein thrombosis) [Z86.718]             Anticoagulation Episode Summary     INR check location:   [facility] Clinic    Preferred lab:   [facility]    Send INR reminders to:       Comments:   Fax progress note to Dr. López [phone]      Anticoagulation Care Providers     Provider Role Specialty Phone number    Kyle López D.O. Referring Cardiology [phone]    [facility] Anticoagulation Services Responsible  [phone]        Anticoagulation Patient Findings  Patient Findings     Negatives:   Signs/symptoms of thrombosis, Signs/symptoms of bleeding, Laboratory test error suspected, Change in health, Change in alcohol use, Change in activity, Upcoming invasive procedure, Emergency department visit, Upcoming dental procedure, Missed doses, Extra doses, Change in medications, Change in diet/appetite, Hospital admission, Bruising, Other complaints              History of Present Illness: follow up appointment for chronic anticoagulation with the high risk medication, warfarin for atrial fibrillation/DVT    Last INR was out of range, dosage adjusted: pt is now at goal. Continue current dosing regimen.  Follow up in 6 weeks, to reduce the risk of adverse events related to this high risk medication, warfarin.  Pt declines vitals today   Aletha Junior Clinical Pharmacist

## 2019-09-20 LAB — INR BLD: 2.4 (ref 0.9–1.2)

## 2019-10-15 DIAGNOSIS — Z79.01 CHRONIC ANTICOAGULATION: ICD-10-CM

## 2019-10-31 ENCOUNTER — ANTICOAGULATION VISIT (OUTPATIENT)
Dept: VASCULAR LAB | Facility: MEDICAL CENTER | Age: 84
End: 2019-10-31
Attending: INTERNAL MEDICINE
Payer: COMMERCIAL

## 2019-10-31 VITALS — DIASTOLIC BLOOD PRESSURE: 46 MMHG | SYSTOLIC BLOOD PRESSURE: 104 MMHG | HEART RATE: 58 BPM

## 2019-10-31 DIAGNOSIS — Z86.718 HISTORY OF DVT (DEEP VEIN THROMBOSIS): ICD-10-CM

## 2019-10-31 DIAGNOSIS — I48.0 PAROXYSMAL ATRIAL FIBRILLATION (HCC): ICD-10-CM

## 2019-10-31 LAB
INR BLD: 2.3 (ref 0.9–1.2)
INR PPP: 2.3 (ref 2–3.5)

## 2019-10-31 PROCEDURE — 85610 PROTHROMBIN TIME: CPT

## 2019-10-31 PROCEDURE — 99211 OFF/OP EST MAY X REQ PHY/QHP: CPT | Performed by: NURSE PRACTITIONER

## 2019-10-31 NOTE — PROGRESS NOTES
Anticoagulation Summary  As of 10/31/2019    INR goal:   2.0-3.0   TTR:   75.5 % (4.5 y)   INR used for dosin.30 (10/31/2019)   Warfarin maintenance plan:   1.5 mg (3 mg x 0.5) every Thu; 3 mg (3 mg x 1) all other days   Weekly warfarin total:   19.5 mg   Plan last modified:   GENNY MuñozPYANIRA (2019)   Next INR check:   2019   Target end date:   Indefinite    Indications    Paroxysmal atrial fibrillation (HCC) [I48.0]  History of DVT (deep vein thrombosis) [Z86.718]             Anticoagulation Episode Summary     INR check location:   [facility]agulation Clinic    Preferred lab:   [facility]    Send INR reminders to:       Comments:   Fax progress note to Dr. López [phone]      Anticoagulation Care Providers     Provider Role Specialty Phone number    Kyle López D.O. Referring Cardiology [phone]    [facility] Anticoagulation Services Responsible  [phone]        Anticoagulation Patient Findings      HPI:  Werner Acuña seen in clinic today for follow up on anticoagulation therapy in the presence of AF, DVT hx.   Denies any changes to current medical/health status since last appointment.   Denies any medication or diet changes.   No current symptoms of bleeding or thrombosis reported.    A/P:   INR therapeutic.   Continue current regimen.   BP recorded in vitals.    Follow up appointment in 3 week(s).    Next Appointment:  at 9:00 am.    Azra NOONAN

## 2019-11-18 ENCOUNTER — HOSPITAL ENCOUNTER (OUTPATIENT)
Dept: LAB | Facility: MEDICAL CENTER | Age: 84
End: 2019-11-18
Attending: FAMILY MEDICINE
Payer: COMMERCIAL

## 2019-11-18 DIAGNOSIS — E11.9 DIABETES MELLITUS TYPE 2 IN NONOBESE (HCC): ICD-10-CM

## 2019-11-18 LAB
CHOLEST SERPL-MCNC: 124 MG/DL (ref 100–199)
CREAT UR-MCNC: 73.4 MG/DL
HDLC SERPL-MCNC: 38 MG/DL
LDLC SERPL CALC-MCNC: 65 MG/DL
MICROALBUMIN UR-MCNC: 0.7 MG/DL
MICROALBUMIN/CREAT UR: 10 MG/G (ref 0–30)
TRIGL SERPL-MCNC: 103 MG/DL (ref 0–149)

## 2019-11-18 PROCEDURE — 36415 COLL VENOUS BLD VENIPUNCTURE: CPT

## 2019-11-18 PROCEDURE — 82043 UR ALBUMIN QUANTITATIVE: CPT

## 2019-11-18 PROCEDURE — 80061 LIPID PANEL: CPT

## 2019-11-18 PROCEDURE — 82570 ASSAY OF URINE CREATININE: CPT

## 2019-11-20 ENCOUNTER — OFFICE VISIT (OUTPATIENT)
Dept: MEDICAL GROUP | Facility: MEDICAL CENTER | Age: 84
End: 2019-11-20
Payer: COMMERCIAL

## 2019-11-20 VITALS
WEIGHT: 195 LBS | SYSTOLIC BLOOD PRESSURE: 140 MMHG | TEMPERATURE: 97.6 F | BODY MASS INDEX: 27.92 KG/M2 | HEART RATE: 65 BPM | OXYGEN SATURATION: 91 % | DIASTOLIC BLOOD PRESSURE: 76 MMHG | RESPIRATION RATE: 20 BRPM | HEIGHT: 70 IN

## 2019-11-20 DIAGNOSIS — C91.10 CHRONIC LYMPHOCYTIC LEUKEMIA (HCC): ICD-10-CM

## 2019-11-20 DIAGNOSIS — E78.2 MIXED HYPERLIPIDEMIA: ICD-10-CM

## 2019-11-20 DIAGNOSIS — Z23 NEED FOR VACCINATION: ICD-10-CM

## 2019-11-20 DIAGNOSIS — I10 ESSENTIAL HYPERTENSION: ICD-10-CM

## 2019-11-20 DIAGNOSIS — I48.0 PAROXYSMAL ATRIAL FIBRILLATION (HCC): ICD-10-CM

## 2019-11-20 DIAGNOSIS — E11.9 DIABETES MELLITUS TYPE 2 IN NONOBESE (HCC): ICD-10-CM

## 2019-11-20 PROBLEM — E78.5 HYPERLIPIDEMIA: Status: ACTIVE | Noted: 2019-11-20

## 2019-11-20 LAB
HBA1C MFR BLD: 5.7 % (ref 0–5.6)
INT CON NEG: NEGATIVE
INT CON POS: POSITIVE

## 2019-11-20 PROCEDURE — 99214 OFFICE O/P EST MOD 30 MIN: CPT | Mod: 25 | Performed by: FAMILY MEDICINE

## 2019-11-20 PROCEDURE — 90662 IIV NO PRSV INCREASED AG IM: CPT | Performed by: FAMILY MEDICINE

## 2019-11-20 PROCEDURE — 90471 IMMUNIZATION ADMIN: CPT | Performed by: FAMILY MEDICINE

## 2019-11-20 PROCEDURE — 83036 HEMOGLOBIN GLYCOSYLATED A1C: CPT | Performed by: FAMILY MEDICINE

## 2019-11-20 NOTE — PROGRESS NOTES
CC: Diabetes, hypertension, hyperlipidemia, chronic lymphocytic leukemia, A. fib.    HPI:    Werner presents today to discuss the following medical issues:    Diabetes mellitus type 2 in nonobese ([facility])  Blood glucose has been adequately controlled.  A1c today is 5.7.  Patient has been on Levemir 10 units at night and Starlix 120 mg twice a day.  Denies any hypoglycemic episodes    Essential hypertension  Has been adequately controlled on current medication. Denies headache, chest pain, and SOB.  Patient has been on carvedilol 20 mg daily..     Dyslipidemia  He has been tolerating the statin. Denies muscle pain LFTs has been normal, patient is currently on Crestor 10 mg daily.    Chronic lymphocytic leukemia (HCC)  Patient was diagnosed 2008, and it has been dormant since then.  Currently asymptomatic.     Paroxysmal atrial fibrillation (HCC)  Denies any palpitation, chest pain, has been having mild shortness of breath with exertion.  He is currently on carvedilol and Coumadin.    He has been following up with Coumadin clinic.  Last INR was 2.3.         Patient Active Problem List    Diagnosis Date Noted   • Hyperlipidemia 11/20/2019   • Obesity (BMI 30-39.9) 01/18/2017   • Dyslipidemia 05/30/2016   • Paroxysmal atrial fibrillation (HCC) 05/07/2015   • History of DVT (deep vein thrombosis) 08/25/2014   • H/O vitamin D deficiency 08/25/2014   • Hyperuricemia 08/25/2014   • Prostate cancer ([facility]) 08/25/2014   • SSS (sick sinus syndrome) ([facility]) 09/19/2012   • Cardiac pacemaker in situ 09/01/2011   • Chronic lymphocytic leukemia ([facility]) 09/01/2011   • Essential hypertension 09/01/2011   • CKD (chronic kidney disease) stage 3, GFR 30-59 ml/min ([facility]) 09/01/2011   • Diabetes mellitus type 2 in nonobese ([facility]) 09/01/2011       Current Outpatient Medications   Medication Sig Dispense Refill   • Carvedilol Phosphate (COREG CR) 20 MG CAPSULE SR 24 HR TAKE 1 CAPSULE BY MOUTH EVERY DAY 90 Cap 3   • ONE TOUCH ULTRA TEST strip TEST  "BLOOD SUGAR TWO TIMES A DAY, 2 DAYS A WEEK 100 Strip 3   • allopurinol (ZYLOPRIM) 100 MG Tab TAKE 1 TABLET BY MOUTH EVERY DAY 90 Tab 3   • warfarin (COUMADIN) 3 MG Tab Take 1-1.5 tabs by mouth daily or as directed by anticoagulation clinic 110 Tab 1   • nateglinide (STARLIX) 120 MG Tab TAKE 1 TABLET BY MOUTH TWICE A  Tab 3   • amLODIPine (NORVASC) 5 MG Tab TAKE 1 TABLET BY MOUTH EVERY DAY 90 Tab 3   • insulin detemir (LEVEMIR FLEXTOUCH) 100 UNIT/ML Solution Pen-injector injection INJECT 25 UNITS SUBCUTANEOUSLY AS DIRECTED AT BEDTIME 30 mL 1   • pioglitazone (ACTOS) 30 MG Tab TAKE 1 TABLET BY MOUTH EVERY DAY 90 Tab 3   • rosuvastatin (CRESTOR) 10 MG Tab TAKE 1 TABLET BY MOUTH EVERY DAY 90 Tab 3   • furosemide (LASIX) 40 MG Tab TAKE 1 TABLET BY MOUTH EVERY DAY 90 Tab 3   • vitamin D (CHOLECALCIFEROL) 1000 UNIT Tab Take 1,000 Units by mouth every day.     • Pseudoephedrine HCl (SUDAFED 12 HOUR PO) Take  by mouth as needed.       • Ferrous Sulfate (IRON) 325 (65 FE) MG TABS Take 1 Tab by mouth every day.     • niacin 500 MG TABS Take 500 mg by mouth every day.       No current facility-administered medications for this visit.          Allergies as of 11/20/2019 - Reviewed 11/20/2019   Allergen Reaction Noted   • Nkda [no known drug allergy]  02/15/2008        ROS: Denies any chest pain, Shortness of breath, Changes bowel or bladder, Lower extremity edema.    Physical Exam:  /76 (BP Location: Right arm, Patient Position: Sitting, BP Cuff Size: Adult)   Pulse 65   Temp 36.4 °C (97.6 °F) (Temporal)   Resp 20   Ht 1.778 m (5' 10\") Comment: patient stated  Wt 88.5 kg (195 lb)   SpO2 91%   BMI 27.98 kg/m²   Gen.: Well-developed, well-nourished, no apparent distress,pleasant and cooperative with the examination  Skin:  Warm and dry with good turgor. No rashes or suspicious lesions in visible areas  HEENT:Sinuses nontender with palpation, TMs clear, nares patent with pink mucosa and clear rhinorrhea,no " septal deviation ,polyps or lesions. lips without lesions, oropharynx clear.  Neck: Trachea midline,no masses or adenopathy. No JVD.  Cor: Regular rate and rhythm without murmur, gallop or rub.  Lungs: Respirations unlabored.Clear to auscultation with equal breath sounds bilaterally. No wheezes, rhonchi.  Extremities: No cyanosis, clubbing or edema.      Assessment and Plan.   86 y.o. male       1. Diabetes mellitus type 2 in nonobese (HCC)  Adequately controlled.  A1c today is 5.7  It has been on Levemir 10 units at night and Starlix 120 mg twice a day.    So will stop insulin.    For now continue Starlix.  Will check A1c next visit if still less than 6.0 we will stop the Starlix as well.    - POCT  A1C    2. Essential hypertension  Adequately controlled.  Continue carvedilol 20 mg daily..     3. Dyslipidemia  He has been tolerating the statin. Denies muscle pain LFTs has been normal  Continue Crestor 10 mg daily.    4. Chronic lymphocytic leukemia (HCC)  Stable.  Asymptomatic.  Diagnosed 2008.  Has been dormant since then.     5. Paroxysmal atrial fibrillation (HCC)  Stable.  No RVR.  Continue on carvedilol and Coumadin.  Continue follow-up with his Coumadin clinic

## 2019-11-21 ENCOUNTER — ANTICOAGULATION VISIT (OUTPATIENT)
Dept: VASCULAR LAB | Facility: MEDICAL CENTER | Age: 84
End: 2019-11-21
Attending: INTERNAL MEDICINE
Payer: COMMERCIAL

## 2019-11-21 DIAGNOSIS — Z86.718 HISTORY OF DVT (DEEP VEIN THROMBOSIS): ICD-10-CM

## 2019-11-21 DIAGNOSIS — I48.0 PAROXYSMAL ATRIAL FIBRILLATION (HCC): ICD-10-CM

## 2019-11-21 LAB
INR BLD: 2.4 (ref 0.9–1.2)
INR PPP: 2.4 (ref 2–3.5)

## 2019-11-21 PROCEDURE — 85610 PROTHROMBIN TIME: CPT

## 2019-11-21 PROCEDURE — 99211 OFF/OP EST MAY X REQ PHY/QHP: CPT

## 2019-11-21 NOTE — PROGRESS NOTES
Anticoagulation Summary  As of 2019    INR goal:   2.0-3.0   TTR:   75.9 % (4.5 y)   INR used for dosin.40 (2019)   Warfarin maintenance plan:   1.5 mg (3 mg x 0.5) every Thu; 3 mg (3 mg x 1) all other days   Weekly warfarin total:   19.5 mg   Plan last modified:   ARNULFO Muñoz (2019)   Next INR check:      Target end date:   Indefinite    Indications    Paroxysmal atrial fibrillation (HCC) [I48.0]  History of DVT (deep vein thrombosis) [Z86.718]             Anticoagulation Episode Summary     INR check location:   Anticoagulation Clinic    Preferred lab:   [facility]    Send INR reminders to:       Comments:   Fax progress note to Dr. López [phone]      Anticoagulation Care Providers     Provider Role Specialty Phone number    Kyle López D.O. Referring Cardiology [phone]    [facility] Anticoagulation Services Responsible  [phone]        Anticoagulation Patient Findings  Patient Findings     Negatives:   Signs/symptoms of thrombosis, Signs/symptoms of bleeding, Laboratory test error suspected, Change in health, Change in alcohol use, Change in activity, Upcoming invasive procedure, Emergency department visit, Upcoming dental procedure, Missed doses, Extra doses, Change in medications, Change in diet/appetite, Hospital admission, Bruising, Other complaints              History of Present Illness: follow up appointment for chronic anticoagulation with the high risk medication, warfarin for DVT/atrial fibrillation    Pt remains therapeutic today. Continue current dosing regimen.  Follow up in 2 weeks, to reduce the risk of adverse events related to this high risk medication, warfarin.  Pt declines vitals today    Aletha Junior Clinical Pharmacist

## 2019-12-05 ENCOUNTER — ANTICOAGULATION VISIT (OUTPATIENT)
Dept: VASCULAR LAB | Facility: MEDICAL CENTER | Age: 84
End: 2019-12-05
Attending: INTERNAL MEDICINE
Payer: COMMERCIAL

## 2019-12-05 DIAGNOSIS — Z86.718 HISTORY OF DVT (DEEP VEIN THROMBOSIS): ICD-10-CM

## 2019-12-05 DIAGNOSIS — I48.0 PAROXYSMAL ATRIAL FIBRILLATION (HCC): ICD-10-CM

## 2019-12-05 LAB
INR BLD: 1.9 (ref 0.9–1.2)
INR PPP: 1.9 (ref 2–3.5)

## 2019-12-05 PROCEDURE — 85610 PROTHROMBIN TIME: CPT

## 2019-12-05 PROCEDURE — 99212 OFFICE O/P EST SF 10 MIN: CPT | Performed by: PHARMACIST

## 2019-12-05 NOTE — PROGRESS NOTES
Anticoagulation Summary  As of 2019    INR goal:   2.0-3.0   TTR:   75.9 % (4.6 y)   INR used for dosin.90! (2019)   Warfarin maintenance plan:   1.5 mg (3 mg x 0.5) every Thu; 3 mg (3 mg x 1) all other days   Weekly warfarin total:   19.5 mg   Plan last modified:   ARNULFO Muñoz (2019)   Next INR check:   2019   Target end date:   Indefinite    Indications    Paroxysmal atrial fibrillation (HCC) [I48.0]  History of DVT (deep vein thrombosis) [Z86.718]             Anticoagulation Episode Summary     INR check location:   Anticoagulation Clinic    Preferred lab:   [facility]    Send INR reminders to:       Comments:   Fax progress note to Dr. López [phone]      Anticoagulation Care Providers     Provider Role Specialty Phone number    Kyle López D.O. Referring Cardiology [phone]    [facility] Responsible  [phone]          Anticoagulation Patient Findings  Patient Findings     Negatives:   Signs/symptoms of thrombosis, Signs/symptoms of bleeding, Laboratory test error suspected, Change in health, Change in alcohol use, Change in activity, Upcoming invasive procedure, Emergency department visit, Upcoming dental procedure, Missed doses, Extra doses, Change in medications, Change in diet/appetite, Hospital admission, Bruising, Other complaints          HPI:  Werner Acuña seen in clinic today, on anticoagulation therapy with warfarin due to hx of PAF and DVT.  Changes to current medical/health status since last appt: NONE  Denies signs/symptoms of bleeding and/or thrombosis since the last appt.    Denies any interval changes to diet  Denies any interval changes to medications since last appt.   Denies any complications or cost restrictions with current therapy.       A/P   INR  slightly sub-therapeutic at 1.9.   Instructed patient to bolus with 3mg X 1, then resume current warfarin regimen.    Follow up appointment in 2  week(s).    Abdoul Lamar, PharmD

## 2019-12-19 ENCOUNTER — ANTICOAGULATION VISIT (OUTPATIENT)
Dept: VASCULAR LAB | Facility: MEDICAL CENTER | Age: 84
End: 2019-12-19
Attending: INTERNAL MEDICINE
Payer: COMMERCIAL

## 2019-12-19 DIAGNOSIS — I48.0 PAROXYSMAL ATRIAL FIBRILLATION (HCC): ICD-10-CM

## 2019-12-19 DIAGNOSIS — Z86.718 HISTORY OF DVT (DEEP VEIN THROMBOSIS): ICD-10-CM

## 2019-12-19 LAB
INR BLD: 1.9 (ref 0.9–1.2)
INR PPP: 1.9 (ref 2–3.5)

## 2019-12-19 PROCEDURE — 99212 OFFICE O/P EST SF 10 MIN: CPT

## 2019-12-19 PROCEDURE — 85610 PROTHROMBIN TIME: CPT

## 2019-12-19 NOTE — PROGRESS NOTES
Anticoagulation Summary  As of 2019    INR goal:   2.0-3.0   TTR:   75.3 % (4.6 y)   INR used for dosin.90! (2019)   Warfarin maintenance plan:   3 mg (3 mg x 1) every day   Weekly warfarin total:   21 mg   Plan last modified:   Aletha Junior (2019)   Next INR check:   2020   Target end date:   Indefinite    Indications    Paroxysmal atrial fibrillation (HCC) [I48.0]  History of DVT (deep vein thrombosis) [Z86.718]             Anticoagulation Episode Summary     INR check location:   Anticoagulation Clinic    Preferred lab:   [facility]    Send INR reminders to:       Comments:   Fax progress note to Dr. López [phone]      Anticoagulation Care Providers     Provider Role Specialty Phone number    Kyle López D.O. Referring Cardiology [phone]    [facility] Anticoagulation Services Responsible  [phone]        Anticoagulation Patient Findings          History of Present Illness: follow up appointment for chronic anticoagulation with the high risk medication, warfarin for atrial fibrillation/DVT    Last INR was out of range, dosage adjusted: pt remains sub therapeutic today.  Will bolus dose with 4.5mg tonight, and increase weekly dose by 7%. Follow up in 2 weeks, to reduce the risk of adverse events related to this high risk medication, warfarin.    Aletha Junior, Clinical Pharmacist

## 2020-01-02 ENCOUNTER — ANTICOAGULATION VISIT (OUTPATIENT)
Dept: VASCULAR LAB | Facility: MEDICAL CENTER | Age: 85
End: 2020-01-02
Attending: INTERNAL MEDICINE
Payer: COMMERCIAL

## 2020-01-02 DIAGNOSIS — Z86.718 HISTORY OF DVT (DEEP VEIN THROMBOSIS): ICD-10-CM

## 2020-01-02 DIAGNOSIS — I48.0 PAROXYSMAL ATRIAL FIBRILLATION (HCC): ICD-10-CM

## 2020-01-02 LAB — INR PPP: 3.1 (ref 2–3.5)

## 2020-01-02 PROCEDURE — 85610 PROTHROMBIN TIME: CPT

## 2020-01-02 PROCEDURE — 99212 OFFICE O/P EST SF 10 MIN: CPT

## 2020-01-02 NOTE — PROGRESS NOTES
Anticoagulation Summary  As of 1/2/2020    INR goal:   2.0-3.0   TTR:   75.3 % (4.6 y)   INR used for dosing:   3.10! (1/2/2020)   Warfarin maintenance plan:   3 mg (3 mg x 1) every day   Weekly warfarin total:   21 mg   Plan last modified:   Aletha Junior (12/19/2019)   Next INR check:   1/16/2020   Target end date:   Indefinite    Indications    Paroxysmal atrial fibrillation (HCC) [I48.0]  History of DVT (deep vein thrombosis) [Z86.718]             Anticoagulation Episode Summary     INR check location:   Anticoagulation Clinic    Preferred lab:   [facility]    Send INR reminders to:       Comments:   Fax progress note to Dr. López [phone]      Anticoagulation Care Providers     Provider Role Specialty Phone number    Kyle López D.O. Referring Cardiology [phone]    [facility] Anticoagulation Services Responsible  [phone]        Anticoagulation Patient Findings          History of Present Illness: follow up appointment for chronic anticoagulation with the high risk medication, warfarin for atrial fibrillation/DVT    Last INR was out of range, dosage adjusted: pt is now slightly supra therapeutic today. Continue current dosing regimen.  Follow up in 2 weeks, to reduce the risk of adverse events related to this high risk medication, warfarin.    Pt declines vitals today  Aletha Junior, Clinical Pharmacist

## 2020-01-16 ENCOUNTER — ANTICOAGULATION VISIT (OUTPATIENT)
Dept: VASCULAR LAB | Facility: MEDICAL CENTER | Age: 85
End: 2020-01-16
Attending: INTERNAL MEDICINE
Payer: COMMERCIAL

## 2020-01-16 DIAGNOSIS — Z86.718 HISTORY OF DVT (DEEP VEIN THROMBOSIS): ICD-10-CM

## 2020-01-16 DIAGNOSIS — I48.0 PAROXYSMAL ATRIAL FIBRILLATION (HCC): ICD-10-CM

## 2020-01-16 LAB
INR BLD: 2.1 (ref 0.9–1.2)
INR PPP: 2.1 (ref 2–3.5)

## 2020-01-16 PROCEDURE — 85610 PROTHROMBIN TIME: CPT

## 2020-01-16 PROCEDURE — 99211 OFF/OP EST MAY X REQ PHY/QHP: CPT | Performed by: NURSE PRACTITIONER

## 2020-01-16 NOTE — PROGRESS NOTES
Anticoagulation Summary  As of 2020    INR goal:   2.0-3.0   TTR:   75.4 % (4.7 y)   INR used for dosin.10 (2020)   Warfarin maintenance plan:   3 mg (3 mg x 1) every day   Weekly warfarin total:   21 mg   Plan last modified:   Aletha CAREY Filter (2019)   Next INR check:   2020   Target end date:   Indefinite    Indications    Paroxysmal atrial fibrillation (HCC) [I48.0]  History of DVT (deep vein thrombosis) [Z86.718]             Anticoagulation Episode Summary     INR check location:   Anticoagulation Clinic    Preferred lab:   [facility]    Send INR reminders to:       Comments:   Fax progress note to Dr. López [phone]      Anticoagulation Care Providers     Provider Role Specialty Phone number    Kyle López D.O. Referring Cardiology [phone]    [facility] Anticoagulation Services Responsible  [phone]        Anticoagulation Patient Findings      HPI:  Werner Acuña seen in clinic today for follow up on anticoagulation therapy in the presence of AF, DVT hx.   Denies any changes to current medical/health status since last appointment.   Denies any medication or diet changes.   No current symptoms of bleeding or thrombosis reported.    A/P:   INR therapeutic.   Continue current regimen.   BP declined.    Follow up appointment in 6 week(s) per pt's request.    Next Appointment: Thursday, 2020 at 10:30 am.    Azra NOONAN

## 2020-01-25 DIAGNOSIS — E11.9 TYPE 2 DIABETES MELLITUS WITHOUT COMPLICATION, WITH LONG-TERM CURRENT USE OF INSULIN (HCC): ICD-10-CM

## 2020-01-25 DIAGNOSIS — Z79.4 TYPE 2 DIABETES MELLITUS WITHOUT COMPLICATION, WITH LONG-TERM CURRENT USE OF INSULIN (HCC): ICD-10-CM

## 2020-01-27 RX ORDER — PIOGLITAZONEHYDROCHLORIDE 30 MG/1
TABLET ORAL
Qty: 90 TAB | Refills: 3 | Status: SHIPPED | OUTPATIENT
Start: 2020-01-27 | End: 2021-02-05

## 2020-02-27 ENCOUNTER — ANTICOAGULATION VISIT (OUTPATIENT)
Dept: VASCULAR LAB | Facility: MEDICAL CENTER | Age: 85
End: 2020-02-27
Attending: INTERNAL MEDICINE
Payer: COMMERCIAL

## 2020-02-27 VITALS — HEART RATE: 60 BPM | SYSTOLIC BLOOD PRESSURE: 157 MMHG | DIASTOLIC BLOOD PRESSURE: 75 MMHG

## 2020-02-27 DIAGNOSIS — I48.0 PAROXYSMAL ATRIAL FIBRILLATION (HCC): ICD-10-CM

## 2020-02-27 DIAGNOSIS — Z86.718 HISTORY OF DVT (DEEP VEIN THROMBOSIS): ICD-10-CM

## 2020-02-27 LAB — INR PPP: 2 (ref 2–3.5)

## 2020-02-27 PROCEDURE — 99211 OFF/OP EST MAY X REQ PHY/QHP: CPT | Performed by: NURSE PRACTITIONER

## 2020-02-27 PROCEDURE — 85610 PROTHROMBIN TIME: CPT

## 2020-02-27 NOTE — PROGRESS NOTES
Anticoagulation Summary  As of 2020    INR goal:   2.0-3.0   TTR:   76.0 % (4.8 y)   INR used for dosin.00 (2020)   Warfarin maintenance plan:   3 mg (3 mg x 1) every day   Weekly warfarin total:   21 mg   Plan last modified:   Aletha AURELIO Filter (2019)   Next INR check:   2020   Target end date:   Indefinite    Indications    Paroxysmal atrial fibrillation (HCC) [I48.0]  History of DVT (deep vein thrombosis) [Z86.718]             Anticoagulation Episode Summary     INR check location:   Anticoagulation Clinic    Preferred lab:   [facility]    Send INR reminders to:       Comments:   Fax progress note to Dr. López [phone]      Anticoagulation Care Providers     Provider Role Specialty Phone number    Kyle López D.O. Referring Cardiology [phone]    [facility] Anticoagulation Services Responsible  [phone]        Anticoagulation Patient Findings      HPI:  Werner Acuña seen in clinic today for follow up on anticoagulation therapy in the presence of DVT, AF hx.   Denies any changes to current medical/health status since last appointment.   Denies any medication or diet changes.   No current symptoms of bleeding or thrombosis reported.    A/P:   INR therapeutic.   Continue current regimen.   BP recorded in vitals.    Follow up appointment in 7 week(s).    Next Appointment:  at 11:00 am.    Azra NOONAN

## 2020-03-02 LAB — INR BLD: 2 (ref 0.9–1.2)

## 2020-04-16 ENCOUNTER — ANTICOAGULATION VISIT (OUTPATIENT)
Dept: VASCULAR LAB | Facility: MEDICAL CENTER | Age: 85
End: 2020-04-16
Attending: INTERNAL MEDICINE
Payer: COMMERCIAL

## 2020-04-16 DIAGNOSIS — I48.0 PAROXYSMAL ATRIAL FIBRILLATION (HCC): ICD-10-CM

## 2020-04-16 DIAGNOSIS — Z86.718 HISTORY OF DVT (DEEP VEIN THROMBOSIS): ICD-10-CM

## 2020-04-16 LAB — INR PPP: 1.8 (ref 2–3.5)

## 2020-04-16 PROCEDURE — 99212 OFFICE O/P EST SF 10 MIN: CPT

## 2020-04-16 PROCEDURE — 85610 PROTHROMBIN TIME: CPT

## 2020-04-16 NOTE — PROGRESS NOTES
Anticoagulation Summary  As of 2020    INR goal:   2.0-3.0   TTR:   74.0 % (4.9 y)   INR used for dosin.80! (2020)   Warfarin maintenance plan:   3 mg (3 mg x 1) every day   Weekly warfarin total:   21 mg   Plan last modified:   Aletha CAREY Filter (2019)   Next INR check:   2020   Target end date:   Indefinite    Indications    Paroxysmal atrial fibrillation (HCC) [I48.0]  History of DVT (deep vein thrombosis) [Z86.718]             Anticoagulation Episode Summary     INR check location:   Anticoagulation Clinic    Preferred lab:   [facility]    Send INR reminders to:       Comments:   Fax progress note to Dr. López [phone]      Anticoagulation Care Providers     Provider Role Specialty Phone number    Kyle López D.O. Referring Cardiology [phone]    [facility] Anticoagulation Services Responsible  [phone]        Anticoagulation Patient Findings      HPI:  Werner Acuña seen in clinic today, on anticoagulation therapy with warfarin for PAF.   Changes to current medical/health status since last appt: none  Denies signs/symptoms of bleeding and/or thrombosis since the last appt.    Denies any interval changes to diet  Denies any interval changes to medications since last appt.   Denies any complications or cost restrictions with current therapy.   Confirmed dosing regimen.     A/P   INR  SUB-therapeutic.   Bolus today then Pt is to continue with current warfarin dosing regimen.     Follow up appointment in 4 week(s).    Miguel Jurado, PharmD

## 2020-04-28 LAB — INR BLD: 1.8 (ref 0.9–1.2)

## 2020-05-14 ENCOUNTER — ANTICOAGULATION VISIT (OUTPATIENT)
Dept: VASCULAR LAB | Facility: MEDICAL CENTER | Age: 85
End: 2020-05-14
Attending: INTERNAL MEDICINE
Payer: COMMERCIAL

## 2020-05-14 DIAGNOSIS — Z86.718 HISTORY OF DVT (DEEP VEIN THROMBOSIS): ICD-10-CM

## 2020-05-14 DIAGNOSIS — I48.0 PAROXYSMAL ATRIAL FIBRILLATION (HCC): ICD-10-CM

## 2020-05-14 LAB — INR PPP: 1.4 (ref 2–3.5)

## 2020-05-14 PROCEDURE — 85610 PROTHROMBIN TIME: CPT

## 2020-05-14 PROCEDURE — 99212 OFFICE O/P EST SF 10 MIN: CPT | Performed by: NURSE PRACTITIONER

## 2020-05-14 NOTE — PROGRESS NOTES
Anticoagulation Summary  As of 2020    INR goal:   2.0-3.0   TTR:   72.8 % (5 y)   INR used for dosin.40! (2020)   Warfarin maintenance plan:   4.5 mg (3 mg x 1.5) every Thu; 3 mg (3 mg x 1) all other days   Weekly warfarin total:   22.5 mg   Plan last modified:   Azra Mcgraw AChiomaPChiomaNChioma (2020)   Next INR check:   2020   Target end date:   Indefinite    Indications    Paroxysmal atrial fibrillation (HCC) [I48.0]  History of DVT (deep vein thrombosis) [Z86.718]             Anticoagulation Episode Summary     INR check location:   Anticoagulation Clinic    Preferred lab:   [facility]    Send INR reminders to:       Comments:   Fax progress note to Dr. López [phone]      Anticoagulation Care Providers     Provider Role Specialty Phone number    Kyle López D.O. Referring Cardiology [phone]    [facility] Anticoagulation Services Responsible  [phone]        Anticoagulation Patient Findings      HPI:  Werner Acuña seen in clinic today for follow up on anticoagulation therapy in the presence of AF, DVT.   Denies any changes to current medical/health status since last appointment.   Denies any medication or diet changes.   No current symptoms of bleeding or thrombosis reported.  May have missed one dose.    A/P:   INR subtherapeutic. No recent VTEs. Low CHAD2 score.  INR trending low so will increase regimen.     Follow up appointment in 3 week(s) per pt's request.    Next Appointment:  at 11:15 am.    Azra NOONAN

## 2020-05-29 DIAGNOSIS — I10 ESSENTIAL HYPERTENSION: ICD-10-CM

## 2020-05-29 RX ORDER — AMLODIPINE BESYLATE 5 MG/1
TABLET ORAL
Qty: 90 TAB | Refills: 3 | Status: SHIPPED | OUTPATIENT
Start: 2020-05-29

## 2020-05-29 RX ORDER — NATEGLINIDE 120 MG/1
TABLET ORAL
Qty: 180 TAB | Refills: 3 | Status: SHIPPED | OUTPATIENT
Start: 2020-05-29

## 2020-06-04 ENCOUNTER — ANTICOAGULATION VISIT (OUTPATIENT)
Dept: VASCULAR LAB | Facility: MEDICAL CENTER | Age: 85
End: 2020-06-04
Attending: INTERNAL MEDICINE
Payer: COMMERCIAL

## 2020-06-04 DIAGNOSIS — Z86.718 HISTORY OF DVT (DEEP VEIN THROMBOSIS): ICD-10-CM

## 2020-06-04 DIAGNOSIS — I48.0 PAROXYSMAL ATRIAL FIBRILLATION (HCC): ICD-10-CM

## 2020-06-04 LAB — INR PPP: 1.8 (ref 2–3.5)

## 2020-06-04 PROCEDURE — 99212 OFFICE O/P EST SF 10 MIN: CPT | Performed by: NURSE PRACTITIONER

## 2020-06-04 PROCEDURE — 85610 PROTHROMBIN TIME: CPT

## 2020-06-04 NOTE — PROGRESS NOTES
Anticoagulation Summary  As of 2020    INR goal:   2.0-3.0   TTR:   72.0 % (5.1 y)   INR used for dosin.80! (2020)   Warfarin maintenance plan:   4.5 mg (3 mg x 1.5) every e, Th; 3 mg (3 mg x 1) all other days   Weekly warfarin total:   24 mg   Plan last modified:   Azra Mcgraw AChiomaP.NChioma (2020)   Next INR check:   2020   Target end date:   Indefinite    Indications    Paroxysmal atrial fibrillation (HCC) [I48.0]  History of DVT (deep vein thrombosis) [Z86.718]             Anticoagulation Episode Summary     INR check location:   Anticoagulation Clinic    Preferred lab:   [facility]    Send INR reminders to:       Comments:   Fax progress note to Dr. López [phone]      Anticoagulation Care Providers     Provider Role Specialty Phone number    Kyle López D.O. Referring Cardiology [phone]    [facility] Anticoagulation Services Responsible  [phone]        Anticoagulation Patient Findings      HPI:  Werner Amrit Arianne seen in clinic today for follow up on anticoagulation therapy in the presence of AF, DVT hx.   Pt had a fall at home a couple of days ago. He hit his cheekbone on patio furniture. Currently has a black eye to his right side. Denies losing consciousness. Didn't go to the ER. States he feels fine.  Denies any medication or diet changes.   No current symptoms of bleeding or thrombosis reported.    A/P:   INR subtherapeutic. INR trending low. Will increase regimen slightly.     Follow up appointment in 4 week(s) per pt's preference.    Next Appointment:  at 11:00 am.     Azra NOONAN

## 2020-06-18 RX ORDER — ALLOPURINOL 100 MG/1
TABLET ORAL
Qty: 90 TAB | Refills: 3 | Status: SHIPPED | OUTPATIENT
Start: 2020-06-18

## 2020-07-02 ENCOUNTER — ANTICOAGULATION VISIT (OUTPATIENT)
Dept: VASCULAR LAB | Facility: MEDICAL CENTER | Age: 85
End: 2020-07-02
Attending: INTERNAL MEDICINE
Payer: COMMERCIAL

## 2020-07-02 DIAGNOSIS — Z86.718 HISTORY OF DVT (DEEP VEIN THROMBOSIS): ICD-10-CM

## 2020-07-02 DIAGNOSIS — I48.0 PAROXYSMAL ATRIAL FIBRILLATION (HCC): ICD-10-CM

## 2020-07-02 LAB — INR PPP: 3.1 (ref 2–3.5)

## 2020-07-02 PROCEDURE — 99211 OFF/OP EST MAY X REQ PHY/QHP: CPT

## 2020-07-02 PROCEDURE — 85610 PROTHROMBIN TIME: CPT

## 2020-07-02 NOTE — PROGRESS NOTES
Anticoagulation Summary  As of 7/2/2020    INR goal:   2.0-3.0   TTR:   72.1 % (5.1 y)   INR used for dosing:   3.10! (7/2/2020)   Warfarin maintenance plan:   4.5 mg (3 mg x 1.5) every Tue, Thu; 3 mg (3 mg x 1) all other days   Weekly warfarin total:   24 mg   Plan last modified:   ARNULFO Muñoz (6/4/2020)   Next INR check:   7/30/2020   Target end date:   Indefinite    Indications    Paroxysmal atrial fibrillation (HCC) [I48.0]  History of DVT (deep vein thrombosis) [Z86.718]             Anticoagulation Episode Summary     INR check location:   Anticoagulation Clinic    Preferred lab:   [facility]    Send INR reminders to:       Comments:   Fax progress note to Dr. López [phone]      Anticoagulation Care Providers     Provider Role Specialty Phone number    Kyle López D.O. Referring Cardiology [phone]    [facility] Anticoagulation Services Responsible  [phone]        Anticoagulation Patient Findings      HPI:  Werner Acuña seen today, on anticoagulation therapy with warfarin for DVT.   Changes to current medical/health status since last appt: none  Denies signs/symptoms of bleeding and/or thrombosis since the last appt.    Denies any interval changes to diet  Denies any interval changes to medications since last appt.   Denies any complications or cost restrictions with current therapy.   No vitals with covid concerns.   Confirmed dosing regimen.     A/P   INR  SUPRA-therapeutic.   INR only elevated by 0.1, no need to change dosing per Chest guidelines.     Follow up appointment in 4 week(s).    Miguel Jurado, PharmD

## 2020-08-14 ENCOUNTER — TELEPHONE (OUTPATIENT)
Dept: VASCULAR LAB | Facility: MEDICAL CENTER | Age: 85
End: 2020-08-14

## 2020-08-19 ENCOUNTER — ANTICOAGULATION VISIT (OUTPATIENT)
Dept: VASCULAR LAB | Facility: MEDICAL CENTER | Age: 85
End: 2020-08-19
Attending: INTERNAL MEDICINE
Payer: COMMERCIAL

## 2020-08-19 VITALS — SYSTOLIC BLOOD PRESSURE: 115 MMHG | HEART RATE: 58 BPM | DIASTOLIC BLOOD PRESSURE: 55 MMHG

## 2020-08-19 DIAGNOSIS — I48.0 PAROXYSMAL ATRIAL FIBRILLATION (HCC): ICD-10-CM

## 2020-08-19 DIAGNOSIS — Z86.718 HISTORY OF DVT (DEEP VEIN THROMBOSIS): ICD-10-CM

## 2020-08-19 LAB — INR PPP: 3 (ref 2–3.5)

## 2020-08-19 PROCEDURE — 99211 OFF/OP EST MAY X REQ PHY/QHP: CPT | Performed by: NURSE PRACTITIONER

## 2020-08-19 PROCEDURE — 85610 PROTHROMBIN TIME: CPT

## 2020-08-19 NOTE — PROGRESS NOTES
Anticoagulation Summary  As of 8/19/2020    INR goal:  2.0-3.0   TTR:  70.3 % (5.3 y)   INR used for dosing:  3.00 (8/19/2020)   Warfarin maintenance plan:  4.5 mg (3 mg x 1.5) every Tue, Thu; 3 mg (3 mg x 1) all other days   Weekly warfarin total:  24 mg   Plan last modified:  GENNY MuñozPYANIRA (6/4/2020)   Next INR check:  9/30/2020   Target end date:  Indefinite    Indications    Paroxysmal atrial fibrillation (HCC) [I48.0]  History of DVT (deep vein thrombosis) [Z86.718]             Anticoagulation Episode Summary     INR check location:  Anticoagulation Clinic    Preferred lab:  [facility]    Send INR reminders to:      Comments:  Fax progress note to Dr. López [phone]      Anticoagulation Care Providers     Provider Role Specialty Phone number    Kyle López D.O. Referring Cardiology [phone]    [facility] Responsible  [phone]        Anticoagulation Patient Findings      HPI:  Werner Acuña seen in clinic today for follow up on anticoagulation therapy in the presence of AF, DVT hx.   Denies any changes to current medical/health status since last appointment.   Denies any medication or diet changes.   No current symptoms of bleeding or thrombosis reported.    A/P:   INR therapeutic. Encouraged to eat a little extra greens as INR upper therapeutic range. He verbalizes understanding.  Continue current regimen.   BP recorded in vitals.    Follow up appointment in 6 week(s).    Next Appointment: Wednesday, Sept 30, 2020 at 10:00 am.    Azra NOONAN

## 2020-08-20 LAB — INR BLD: 3 (ref 0.9–1.2)

## 2020-08-23 DIAGNOSIS — I10 ESSENTIAL HYPERTENSION: ICD-10-CM

## 2020-08-24 RX ORDER — CARVEDILOL PHOSPHATE 20 MG/1
20 CAPSULE, EXTENDED RELEASE ORAL DAILY
Qty: 90 CAP | Refills: 3 | Status: SHIPPED | OUTPATIENT
Start: 2020-08-24

## 2020-09-30 ENCOUNTER — ANTICOAGULATION VISIT (OUTPATIENT)
Dept: VASCULAR LAB | Facility: MEDICAL CENTER | Age: 85
End: 2020-09-30
Attending: INTERNAL MEDICINE
Payer: COMMERCIAL

## 2020-09-30 DIAGNOSIS — Z86.718 HISTORY OF DVT (DEEP VEIN THROMBOSIS): ICD-10-CM

## 2020-09-30 DIAGNOSIS — I48.0 PAROXYSMAL ATRIAL FIBRILLATION (HCC): ICD-10-CM

## 2020-09-30 LAB
INR BLD: 1.9 (ref 0.9–1.2)
INR PPP: 1.9 (ref 2–3.5)

## 2020-09-30 PROCEDURE — 99211 OFF/OP EST MAY X REQ PHY/QHP: CPT | Performed by: PHARMACIST

## 2020-09-30 PROCEDURE — 85610 PROTHROMBIN TIME: CPT

## 2020-09-30 NOTE — PROGRESS NOTES
Anticoagulation Summary  As of 9/30/2020    INR goal:  2.0-3.0   TTR:  70.3 % (5.3 y)   INR used for dosing:     Warfarin maintenance plan:  4.5 mg (3 mg x 1.5) every Tue, Thu; 3 mg (3 mg x 1) all other days   Weekly warfarin total:  24 mg   Plan last modified:  ARNULFO Muñoz (6/4/2020)   Next INR check:     Target end date:  Indefinite    Indications    Paroxysmal atrial fibrillation (HCC) [I48.0]  History of DVT (deep vein thrombosis) [Z86.718]             Anticoagulation Episode Summary     INR check location:  Anticoagulation Clinic    Preferred lab:  [facility]    Send INR reminders to:      Comments:  Fax progress note to Dr. López [phone]      Anticoagulation Care Providers     Provider Role Specialty Phone number    Kyle López D.O. Referring Cardiology [phone]    [facility] Anticoagulation Services Responsible  [phone]                Anticoagulation Patient Findings      HPI:  Werner Acuña seen in clinic today, on anticoagulation therapy with warfarin for afib and DVT  Changes to current medical/health status since last appt: none  Denies signs/symptoms of bleeding and/or thrombosis since the last appt.    Denies any interval changes to diet  Denies any interval changes to medications since last appt.   Denies any complications or cost restrictions with current therapy.   Verified current warfarin dosing schedule.   BP recorded in vitals. Pt declined.      A/P   INR slightly subtherapeutic.   Pt is to continue with current warfarin dosing regimen.      Follow up appointment in 6 weeks.  Mayra Zamora Pharmacy Intern  Abdoul Lamar, JaredD           RX approved. Refill sent to listed pharmacy per protocol.

## 2020-10-08 DIAGNOSIS — Z79.01 CHRONIC ANTICOAGULATION: ICD-10-CM

## 2020-10-26 ENCOUNTER — HOSPITAL ENCOUNTER (EMERGENCY)
Facility: MEDICAL CENTER | Age: 85
End: 2020-10-26
Attending: EMERGENCY MEDICINE
Payer: COMMERCIAL

## 2020-10-26 ENCOUNTER — APPOINTMENT (OUTPATIENT)
Dept: RADIOLOGY | Facility: MEDICAL CENTER | Age: 85
End: 2020-10-26
Attending: EMERGENCY MEDICINE
Payer: COMMERCIAL

## 2020-10-26 VITALS
HEART RATE: 99 BPM | HEIGHT: 72 IN | SYSTOLIC BLOOD PRESSURE: 122 MMHG | BODY MASS INDEX: 26.41 KG/M2 | OXYGEN SATURATION: 92 % | TEMPERATURE: 97.3 F | RESPIRATION RATE: 16 BRPM | WEIGHT: 195 LBS | DIASTOLIC BLOOD PRESSURE: 58 MMHG

## 2020-10-26 DIAGNOSIS — S09.90XA CLOSED HEAD INJURY, INITIAL ENCOUNTER: ICD-10-CM

## 2020-10-26 DIAGNOSIS — S00.83XA CONTUSION OF FACE, INITIAL ENCOUNTER: ICD-10-CM

## 2020-10-26 PROCEDURE — 99283 EMERGENCY DEPT VISIT LOW MDM: CPT

## 2020-10-26 PROCEDURE — 72125 CT NECK SPINE W/O DYE: CPT

## 2020-10-26 PROCEDURE — 70450 CT HEAD/BRAIN W/O DYE: CPT

## 2020-10-26 NOTE — ED TRIAGE NOTES
Chief Complaint   Patient presents with   • T-5000 FALL     tripped and fell , on coumadin      Pt bib from [facility], he was visiting his friend , tripped and fell. Denies loc, hematoma right side forehead noted. Pt on coumadin 3mg.   Denies neck and back pain.   Pt taken to ct for code tbi , now blue 15, gave report to Tammy CHAO.

## 2020-10-26 NOTE — ED PROVIDER NOTES
ED Provider Note    Scribed for Dr. Kyle Ramirez M.D. by Shelia Oliva. 10/26/2020  3:08 PM    Primary care provider: Kip Dyson M.D.  Means of arrival: Walk In  History obtained from: Patient  History limited by: None     CHIEF COMPLAINT  Chief Complaint   Patient presents with   • T-5000 FALL     tripped and fell , on coumadin        Austen Riggs Centernatividad Acuña is a 87 y.o. male with a history of diabetes, hyperlipidemia, hypertension, and DVT who presents to the Emergency Department for a T-5000 fall onset prior to arrival. The patient states he visiting someone earlier today when he tripped and hit the right side of his forehead and right hand. He denies experiencing any loss of consciousness during the event or vomiting after hitting his head. Patient was then prompted to call EMS to bring him into the [facility] ED to have his symptoms further evaluated. When the patient arrived to the [facility] ED he was able to recall his birthday and full name. No alleviating factors were noted, and pain is exacerbated by palpating the right side of the forehead. Patient has associated right hand pain and right forehead pain, but denies fever, chills, cough, or shortness of breath. He also denies coming into contact with anyone who has tested positive for Covid-19 or taking any recent travels outside of the country. Patient doesn't smoke, drink alcohol, or use drugs. He has no known drug allergies and takes 3 mg of Coumadin daily. Patient's PCP is Dr. Jennings.     REVIEW OF SYSTEMS  Pertinent positives include T-5000 fall, right hand pain, and right forehead pain. Pertinent negatives include no fever, chills, cough, or shortness of breath. See [facility] for further details.     PAST MEDICAL HISTORY   has a past medical history of Diabetes, DVT, Hyperlipidemia, Hypertension, and Kidney disease.    SURGICAL HISTORY   has a past surgical history that includes pacemaker insertion.    SOCIAL HISTORY  Social History      Tobacco Use   • Smoking status: Never Smoker   • Smokeless tobacco: Never Used   Substance Use Topics   • Alcohol use: No   • Drug use: No      Social History     Substance and Sexual Activity   Drug Use No       FAMILY HISTORY  Family History   Problem Relation Age of Onset   • Other Mother    • Lung Disease Father        CURRENT MEDICATIONS  Home Medications    **Home medications have not yet been reviewed for this encounter**         ALLERGIES  Allergies   Allergen Reactions   • Nkda [No Known Drug Allergy]        PHYSICAL EXAM  VITAL SIGNS: /58   Pulse 84   Temp 36.3 °C (97.3 °F) (Temporal)   Resp 20   Ht 1.829 m (6')   Wt 88.5 kg (195 lb)   SpO2 96%   BMI 26.45 kg/m²     Constitutional: Well developed, Well nourished, Mild distress, Non-toxic appearance.   HENT: Normocephalic, Atraumatic, Bilateral external ears normal, Oropharynx moist, No oral exudates.   Eyes: PERRLA, EOMI, Conjunctiva normal, No discharge.   Neck: No tenderness, Supple, No stridor.   Lymphatic: No lymphadenopathy noted.   Cardiovascular: Normal heart rate, Normal rhythm.   Thorax & Lungs: Clear to auscultation bilaterally, No respiratory distress, No wheezing, No crackles.   Abdomen: Soft, No tenderness, No masses, No pulsatile masses.   Skin: Right eyebrow hematoma, 0.5 cm laceration to the right eyebrow, Warm, Dry, No rash.   Extremities:, No edema No cyanosis.   Musculoskeletal: No tenderness to palpation or major deformities noted.  Intact distal pulses  Neurologic: Awake, alert. Moves all extremities spontaneously.  Psychiatric: Affect normal, Judgment normal, Mood normal.     RADIOLOGY  CT-HEAD W/O   Final Result      No evidence of skull fracture or intracranial hemorrhage.      CT-CSPINE WITHOUT PLUS RECONS   Final Result      1.  No acute fracture is identified.      2.  Multilevel degenerative disc disease and facet arthropathy.      3.  Atherosclerosis.        The radiologist's interpretation of all radiological  studies have been reviewed by me.    COURSE & MEDICAL DECISION MAKING  Pertinent Labs & Imaging studies reviewed. (See chart for details)    3:08 PM - Patient seen and examined at bedside. Discussed plan of care with patient. I informed them that imaging will be ordered to evaluate symptoms. The patient is understanding and agreeable with plan of care. Ordered CT-CSpine w/o and CT-Head w/o to evaluate his symptoms. The differential diagnoses include but are not limited to: Intracranial  hemorrhage, Closed head injury     3:35 PM Patient was reevaluated at bedside. He reports his last tetanus shot was 5 years ago. Patient was told his right eyebrow laceration will be cleaned. He is understanding and agreeable to the plan of care.     3:39 PM Patient was reevaluated at bedside. Discussed radiology results with the patient and informed them that there were no acute or abnormal findings as noted above. The plan of care was discussed with the patient. He is understanding and agreeable to the plan of care. Patient had the opportunity to ask any questions. The plan for discharge was discussed with the patient and he was told to to return for any new or worsening symptoms and to follow up with his PCP. Patient is understanding and agreeable to the plan for discharge.      PPE Note: I verified that the patient was wearing a mask and I was wearing appropriate PPE every time I entered the room. The patient's mask was on the patient at all times during my encounter except for a brief view of the oropharynx.     Decision Making:  Patient seen as a TBI, after a fall with a forehead hematoma.  He is on Coumadin chronically.  There is no evidence of acute head injury CT scan of the head and neck is negative laceration of his forehead did not require any intervention will be cleaned and dressed tetanus status was current    The patient will return for new or worsening symptoms and is stable at the time of  discharge.    DISPOSITION:  Patient will be discharged home in stable condition.    FOLLOW UP:  No follow-up provider specified.    OUTPATIENT MEDICATIONS:  Discharge Medication List as of 10/26/2020  3:42 PM           FINAL IMPRESSION  1. Closed head injury, initial encounter    2. Contusion of face, initial encounter          Shelia PEREIRA (Scribe), am scribing for, and in the presence of, Kyle Ramirez M.D..    Electronically signed by: Shelia Oliva (Scribe), 10/26/2020    IKyle M.D. personally performed the services described in this documentation, as scribed by Shelia Oliva in my presence, and it is both accurate and complete. E    The note accurately reflects work and decisions made by me.  Kyle Ramirez M.D.  10/26/2020  8:38 PM

## 2020-11-11 ENCOUNTER — ANTICOAGULATION VISIT (OUTPATIENT)
Dept: VASCULAR LAB | Facility: MEDICAL CENTER | Age: 85
End: 2020-11-11
Attending: INTERNAL MEDICINE
Payer: COMMERCIAL

## 2020-11-11 DIAGNOSIS — I48.0 PAROXYSMAL ATRIAL FIBRILLATION (HCC): ICD-10-CM

## 2020-11-11 DIAGNOSIS — Z86.718 HISTORY OF DVT (DEEP VEIN THROMBOSIS): ICD-10-CM

## 2020-11-11 LAB — INR PPP: 1.3 (ref 2–3.5)

## 2020-11-11 PROCEDURE — 99212 OFFICE O/P EST SF 10 MIN: CPT

## 2020-11-11 PROCEDURE — 85610 PROTHROMBIN TIME: CPT
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
## 2020-11-12 LAB — INR BLD: 1.3 (ref 0.9–1.2)

## 2020-11-16 ENCOUNTER — ANTICOAGULATION VISIT (OUTPATIENT)
Dept: VASCULAR LAB | Facility: MEDICAL CENTER | Age: 85
End: 2020-11-16
Attending: INTERNAL MEDICINE
Payer: COMMERCIAL

## 2020-11-16 DIAGNOSIS — I48.0 PAROXYSMAL ATRIAL FIBRILLATION (HCC): ICD-10-CM

## 2020-11-16 DIAGNOSIS — Z86.718 HISTORY OF DVT (DEEP VEIN THROMBOSIS): ICD-10-CM

## 2020-11-16 LAB
INR BLD: 1.3 (ref 0.9–1.2)
INR PPP: 1.3 (ref 2–3.5)

## 2020-11-16 PROCEDURE — 85610 PROTHROMBIN TIME: CPT

## 2020-11-16 PROCEDURE — 99212 OFFICE O/P EST SF 10 MIN: CPT

## 2020-11-16 NOTE — PROGRESS NOTES
Anticoagulation Summary  As of 2020    INR goal:  2.0-3.0   TTR:  69.1 % (5.5 y)   INR used for dosin.30 (2020)   Warfarin maintenance plan:  4.5 mg (3 mg x 1.5) every Tue, Thu; 3 mg (3 mg x 1) all other days   Weekly warfarin total:  24 mg   Plan last modified:  ARNULFO Muñoz (2020)   Next INR check:  2020   Target end date:  Indefinite    Indications    Paroxysmal atrial fibrillation (HCC) [I48.0]  History of DVT (deep vein thrombosis) [Z86.718]             Anticoagulation Episode Summary     INR check location:  Anticoagulation Clinic    Preferred lab:  [facility]    Send INR reminders to:      Comments:  Fax progress note to Dr. López [phone]      Anticoagulation Care Providers     Provider Role Specialty Phone number    Kyle López D.O. Referring Cardiology [phone]    [facility] Responsible  [phone]               Anticoagulation Patient Findings  Patient Findings     Negatives:  Signs/symptoms of thrombosis, Signs/symptoms of bleeding, Laboratory test error suspected, Change in health, Change in alcohol use, Change in activity, Upcoming invasive procedure, Emergency department visit, Upcoming dental procedure, Missed doses, Extra doses, Change in medications, Change in diet/appetite, Hospital admission, Bruising, Other complaints          HPI:  Werner Acuña seen in clinic today, on anticoagulation therapy with warfarin for atrial fibrillation, hx of DVT  Changes to current medical/health status since last appt: none  Denies signs/symptoms of bleeding and/or thrombosis since the last appt.    Denies any interval changes to diet  Denies any interval changes to medications since last appt.   Denies any complications or cost restrictions with current therapy.   Unable to verify current warfarin dosing schedule - likely pt missed doses. He is moving to [place] with his daughter on Wednesday. Pt's daughter states he  most likely will be able to afford a DOAC but would like to hold off on transitioning until after the move.  BP declined  Medications reconciled       A/P   INR  sub-therapeutic.   Bolus x 2 days then continue regimen  Placed standing lab order for pt to get drawn in [place] until he establishes with another provider.    Follow up INR in 1 week(s).    Estela Lopez, PharmD

## 2021-01-11 DIAGNOSIS — Z23 NEED FOR VACCINATION: ICD-10-CM

## 2021-02-05 DIAGNOSIS — Z79.4 TYPE 2 DIABETES MELLITUS WITHOUT COMPLICATION, WITH LONG-TERM CURRENT USE OF INSULIN (HCC): ICD-10-CM

## 2021-02-05 DIAGNOSIS — E11.9 TYPE 2 DIABETES MELLITUS WITHOUT COMPLICATION, WITH LONG-TERM CURRENT USE OF INSULIN (HCC): ICD-10-CM

## 2021-02-05 RX ORDER — PIOGLITAZONEHYDROCHLORIDE 30 MG/1
TABLET ORAL
Qty: 90 TAB | Refills: 3 | Status: SHIPPED | OUTPATIENT
Start: 2021-02-05

## 2021-10-07 DIAGNOSIS — Z79.01 CHRONIC ANTICOAGULATION: ICD-10-CM

## 2021-12-17 ENCOUNTER — ANTICOAGULATION MONITORING (OUTPATIENT)
Dept: VASCULAR LAB | Facility: MEDICAL CENTER | Age: 86
End: 2021-12-17

## 2021-12-17 DIAGNOSIS — Z86.718 HISTORY OF DVT (DEEP VEIN THROMBOSIS): ICD-10-CM

## 2021-12-17 DIAGNOSIS — I48.0 PAROXYSMAL ATRIAL FIBRILLATION (HCC): ICD-10-CM

## 2021-12-17 NOTE — PROGRESS NOTES
Discharged from [facility].  Pt managed by PCP in [facility], Clinical Pharmacist, CDE, CACP

## 2022-05-24 NOTE — PROGRESS NOTES
Anticoagulation Summary as of 8/7/2017     INR goal 2.0-3.0   Selected INR 1.5! (8/7/2017)   Maintenance plan 4.5 mg (3 mg x 1.5) on Mon, Fri; 3 mg (3 mg x 1) all other days   Weekly total 24 mg   Plan last modified Melissa Wick, PHARMD (8/7/2017)   Next INR check 8/21/2017   Target end date     Indications   Paroxysmal atrial fibrillation (CMS-HCC) [I48.0]  History of DVT (deep vein thrombosis) [Z86.718]         Anticoagulation Episode Summary     INR check location [facility]    Preferred lab [facility]    Send INR reminders to     Comments Fax progress note to Dr. Prajapati [phone]      Anticoagulation Care Providers     Provider Role Specialty Phone number    Tito Prajapati M.D. Referring Cardiology [phone]    [facility] Responsible  [phone]        Anticoagulation Patient Findings   Positives Diet Changes    Negatives Missed Doses, Extra Doses, Medication Changes, Antibiotic Use, Dental/Other Procedures, Hospitalization, Bleeding Gums, Nose Bleeds, Blood in Urine, Blood in Stool, Any Bruising, Other Complaints          Current Outpatient Prescriptions on File Prior to Visit   Medication Sig Dispense Refill   • nateglinide (STARLIX) 120 MG Tab TAKE 1 TABLET BY MOUTH TWICE A DAY 60 Tab 1   • amlodipine (NORVASC) 5 MG Tab Take 1 Tab by mouth every day. 90 Tab 1   • allopurinol (ZYLOPRIM) 100 MG Tab Take 1 Tab by mouth every day. TAKE 1 TAB BY MOUTH EVERY DAY. 90 Tab 3   • vitamin D (CHOLECALCIFEROL) 1000 UNIT Tab Take 1,000 Units by mouth every day.     • pioglitazone (ACTOS) 30 MG Tab Take 1 Tab by mouth every day. 90 Tab 3   • furosemide (LASIX) 40 MG Tab Take 1 Tab by mouth every day. 90 Tab 3   • Carvedilol Phosphate (COREG CR) 20 MG CAPSULE SR 24 HR Take 1 Cap by mouth every day. 90 Cap 2   • azithromycin (ZITHROMAX) 250 MG Tab 2 tablets Day 1, then 1 tablet a day 6 Tab 0   • glucose blood (ONE TOUCH ULTRA TEST) strip 1 Strip by Other route BID 2 DAYS A WEEK. 100  Vancomycin Assessment    Ashely Barr is a 59 y o  male who is currently receiving vancomycin 1250mg IV q12 hours for other bone/joint   Relevant clinical data and objective history reviewed:  Creatinine   Date Value Ref Range Status   05/24/2022 2 02 (H) 0 60 - 1 30 mg/dL Final     Comment:     Standardized to IDMS reference method   05/15/2022 1 70 (H) 0 60 - 1 30 mg/dL Final     Comment:     Standardized to IDMS reference method   05/14/2022 1 88 (H) 0 60 - 1 30 mg/dL Final     Comment:     Standardized to IDMS reference method   06/25/2015 1 16 0 60 - 1 30 mg/dL Final     Comment:     Standardized to IDMS reference method  The above 17 analytes were performed by [facility] [id number]     01/07/2015 1 10 0 60 - 1 30 mg/dL Final     Comment:     Standardized to IDMS reference method   12/22/2014 1 12 0 60 - 1 30 mg/dL Final     Comment:     Standardized to IDMS reference method     /76 (BP Location: Left arm)   Pulse 79   Temp 98 5 °F (36 9 °C) (Oral)   Resp 16   Ht 5' 10" (1 778 m)   Wt 80 2 kg (176 lb 12 9 oz)   SpO2 98%   BMI 25 37 kg/m²   No intake/output data recorded  Lab Results   Component Value Date/Time    BUN 39 (H) 05/24/2022 03:36 PM    BUN 30 (H) 06/25/2015 10:38 AM    WBC 10 57 (H) 05/24/2022 03:36 PM    WBC 7 60 06/25/2015 10:38 AM    HGB 12 5 05/24/2022 03:36 PM    HGB 11 6 (L) 06/25/2015 10:38 AM    HCT 39 8 05/24/2022 03:36 PM    HCT 34 5 (L) 06/25/2015 10:38 AM    MCV 86 05/24/2022 03:36 PM    MCV 84 06/25/2015 10:38 AM     05/24/2022 03:36 PM     06/25/2015 10:38 AM     Temp Readings from Last 3 Encounters:   05/24/22 98 5 °F (36 9 °C) (Oral)   05/19/22 (!) 97 °F (36 1 °C)   05/15/22 98 7 °F (37 1 °C)     Vancomycin Days of Therapy: 1    Assessment/Plan  The patient is currently on vancomycin utilizing scheduled dosing based on actual body weight    Baseline risks associated with therapy include: pre-existing renal impairment and Strip 3   • rosuvastatin (CRESTOR) 10 MG Tab Take 1 Tab by mouth every day. 90 Tab 3   • LEVEMIR FLEXPEN 100 UNIT/ML Solution Pen-injector injection INJECT 25 UNITS AS DIRECTED AT BEDTIME (Patient taking differently: INJECT 19 UNITS AS DIRECTED AT BEDTIME) 45 PEN 1   • Pseudoephedrine HCl (SUDAFED 12 HOUR PO) Take  by mouth as needed.       • warfarin (COUMADIN) 3 MG TABS Take 0.5 Tabs by mouth every day. Followed by Dr. Palomino 1 Each 0   • Ferrous Sulfate (IRON) 325 (65 FE) MG TABS Take 1 Tab by mouth every day.     • niacin 500 MG TABS Take 500 mg by mouth every day.       No current facility-administered medications on file prior to visit.       Lab Results   Component Value Date/Time    SODIUM 142 12/01/2016 11:06 AM    POTASSIUM 4.3 12/01/2016 11:06 AM    CHLORIDE 105 12/01/2016 11:06 AM    CO2 27 12/01/2016 11:06 AM    GLUCOSE 101* 12/01/2016 11:06 AM    BUN 35* 12/01/2016 11:06 AM    CREATININE 1.91* 12/01/2016 11:06 AM    BUN-CREATININE RATIO 17 02/26/2015 06:55 AM          Werner Acuña seen in clinic today  INR  SUB-therapeutic.    Pt feels he's eating more greens.   Denies signs/symptoms of bleeding and/or thrombosis.    Denies changes to diet or medications.   Follow up appointment in 2 week(s).      Will have pt take a bolus dose of 6mg today and then start a 7% weekly increased dose. Pt also starting radiation therapy today for prostate cancer. CHADsVasc = 4 (HTN, age, DM2)     Melissa Wick, PHARMD              advanced age  The patient is currently receiving 1250mg IV q12 hours and after clinical evaluation will be changed to 1500mg IV q24 hours  Pharmacy will also follow closely for s/sx of nephrotoxicity, infusion reactions, and appropriateness of therapy  BMP and CBC will be ordered per protocol  Plan for trough as patient approaches steady state, prior to the 4th  dose at approximately 1730 on 5/27/22  Due to infection severity, will target a trough of 15-20 (appropriate for most indications)   Pharmacy will continue to follow the patients culture results and clinical progress daily      Christine Bobby, Pharmacist

## 2023-04-05 NOTE — TELEPHONE ENCOUNTER
Was the patient seen in the last year in this department? Yes     Does patient have an active prescription for medications requested? No     Received Request Via: Pharmacy       Consent: Written consent was obtained and risks were reviewed including but not limited to scarring, infection, bleeding, scabbing, incomplete removal, nerve damage and allergy to anesthesia.

## 2024-08-05 ENCOUNTER — TELEPHONE (OUTPATIENT)
Dept: HEALTH INFORMATION MANAGEMENT | Facility: OTHER | Age: 89
End: 2024-08-05
Payer: COMMERCIAL